# Patient Record
Sex: MALE | Race: WHITE | Employment: FULL TIME | URBAN - METROPOLITAN AREA
[De-identification: names, ages, dates, MRNs, and addresses within clinical notes are randomized per-mention and may not be internally consistent; named-entity substitution may affect disease eponyms.]

---

## 2018-10-12 ENCOUNTER — HOSPITAL ENCOUNTER (OUTPATIENT)
Age: 38
Discharge: HOME | End: 2018-10-14

## 2018-10-12 PROCEDURE — 88304 TISSUE EXAM BY PATHOLOGIST: CPT

## 2019-01-26 LAB
ALBUMIN SERPL-MCNC: NORMAL G/DL
ALP BLD-CCNC: NORMAL U/L
ALT SERPL-CCNC: NORMAL U/L
ANION GAP SERPL CALCULATED.3IONS-SCNC: NORMAL MMOL/L
AST SERPL-CCNC: NORMAL U/L
BILIRUB SERPL-MCNC: NORMAL MG/DL
BUN BLDV-MCNC: NORMAL MG/DL
CALCIUM SERPL-MCNC: NORMAL MG/DL
CHLORIDE BLD-SCNC: NORMAL MMOL/L
CHOLESTEROL, TOTAL: NORMAL
CHOLESTEROL/HDL RATIO: NORMAL
CO2: NORMAL
CREAT SERPL-MCNC: NORMAL MG/DL
GFR CALCULATED: NORMAL
GLUCOSE BLD-MCNC: NORMAL MG/DL
HDLC SERPL-MCNC: NORMAL MG/DL
LDL CHOLESTEROL CALCULATED: NORMAL
POTASSIUM SERPL-SCNC: NORMAL MMOL/L
SODIUM BLD-SCNC: NORMAL MMOL/L
TOTAL PROTEIN: NORMAL
TRIGL SERPL-MCNC: NORMAL MG/DL
TSH SERPL DL<=0.05 MIU/L-ACNC: NORMAL M[IU]/L
VLDLC SERPL CALC-MCNC: NORMAL MG/DL

## 2019-08-16 ENCOUNTER — OFFICE VISIT (OUTPATIENT)
Dept: FAMILY MEDICINE CLINIC | Age: 39
End: 2019-08-16
Payer: COMMERCIAL

## 2019-08-16 VITALS
WEIGHT: 315 LBS | HEART RATE: 72 BPM | OXYGEN SATURATION: 98 % | BODY MASS INDEX: 42.66 KG/M2 | TEMPERATURE: 96.9 F | HEIGHT: 72 IN | DIASTOLIC BLOOD PRESSURE: 82 MMHG | SYSTOLIC BLOOD PRESSURE: 126 MMHG

## 2019-08-16 DIAGNOSIS — J40 BRONCHITIS: Primary | ICD-10-CM

## 2019-08-16 PROCEDURE — 99213 OFFICE O/P EST LOW 20 MIN: CPT | Performed by: FAMILY MEDICINE

## 2019-08-16 RX ORDER — AZITHROMYCIN 250 MG/1
TABLET, FILM COATED ORAL
Qty: 6 TABLET | Refills: 0 | Status: SHIPPED | OUTPATIENT
Start: 2019-08-16 | End: 2019-08-26 | Stop reason: ALTCHOICE

## 2019-08-16 RX ORDER — PREDNISONE 10 MG/1
TABLET ORAL
Qty: 12 TABLET | Refills: 0 | Status: SHIPPED | OUTPATIENT
Start: 2019-08-16 | End: 2019-08-22

## 2019-08-16 RX ORDER — SERTRALINE HYDROCHLORIDE 100 MG/1
100 TABLET, FILM COATED ORAL DAILY
Refills: 12 | COMMUNITY
Start: 2019-07-25 | End: 2020-02-12 | Stop reason: SDUPTHER

## 2019-08-16 NOTE — PROGRESS NOTES
19  Josue Beachy : 1980 Sex: male  Age: 46 y.o. Chief Complaint   Patient presents with    Congestion     has been using OTC medication but it has not been helping x2 weeks       HPI  HPI:    Patient presents today complaining of cough for over 2 weeks, productive, intermittent wheezing, no chest pain shortness of breath. No headache dizziness fever chills. No significant nasal congestion. No abdominal symptoms. No other complaints or concerns. ROS:  As above      Current Outpatient Medications:     sertraline (ZOLOFT) 100 MG tablet, Take 100 mg by mouth daily, Disp: , Rfl: 12    predniSONE (DELTASONE) 10 MG tablet, Take 3 tablets by mouth daily for 2 days, THEN 2 tablets daily for 2 days, THEN 1 tablet daily for 2 days. , Disp: 12 tablet, Rfl: 0    azithromycin (ZITHROMAX) 250 MG tablet, 2 po qd  day 1 then 1 po qd  days 2 - 5, Disp: 6 tablet, Rfl: 0  No Known Allergies    History reviewed. No pertinent past medical history. History reviewed. No pertinent surgical history. History reviewed. No pertinent family history. Social History     Tobacco Use    Smoking status: Never Smoker    Smokeless tobacco: Never Used   Substance Use Topics    Alcohol use: Not on file    Drug use: Not on file      Social History     Social History Narrative    Not on file        Vitals:    19 0818   BP: 126/82   Pulse: 72   Temp: 96.9 °F (36.1 °C)   TempSrc: Tympanic   SpO2: 98%   Weight: (!) 317 lb 8 oz (144 kg)   Height: 6' (1.829 m)     Wt Readings from Last 3 Encounters:   19 (!) 317 lb 8 oz (144 kg)        Physical Exam    Exam:  Const: Appears comfortable. No signs of acute distress present. Head/Face: Atraumatic, normocephalic on inspection. Eyes: No discharge from the eyes. Sclerae clear. ENMT:   Ears fluid, nose boggy, oral pharynx thick postnasal drainage no exudate swelling or asymmetry. Neck: Supple. Palpation reveals no adenopathy. No masses appreciated. No JVD.   Resp:

## 2019-08-26 ENCOUNTER — OFFICE VISIT (OUTPATIENT)
Dept: FAMILY MEDICINE CLINIC | Age: 39
End: 2019-08-26
Payer: COMMERCIAL

## 2019-08-26 VITALS
RESPIRATION RATE: 16 BRPM | SYSTOLIC BLOOD PRESSURE: 130 MMHG | HEIGHT: 72 IN | TEMPERATURE: 97.7 F | OXYGEN SATURATION: 99 % | DIASTOLIC BLOOD PRESSURE: 82 MMHG | BODY MASS INDEX: 42.66 KG/M2 | WEIGHT: 315 LBS | HEART RATE: 77 BPM

## 2019-08-26 DIAGNOSIS — J06.9 UPPER RESPIRATORY TRACT INFECTION, UNSPECIFIED TYPE: Primary | ICD-10-CM

## 2019-08-26 DIAGNOSIS — R09.82 POSTNASAL DRIP: ICD-10-CM

## 2019-08-26 DIAGNOSIS — J01.90 ACUTE NON-RECURRENT SINUSITIS, UNSPECIFIED LOCATION: ICD-10-CM

## 2019-08-26 DIAGNOSIS — R07.0 PAIN IN THROAT: ICD-10-CM

## 2019-08-26 PROCEDURE — 99214 OFFICE O/P EST MOD 30 MIN: CPT | Performed by: PHYSICIAN ASSISTANT

## 2019-08-26 RX ORDER — ALBUTEROL SULFATE 90 UG/1
2 AEROSOL, METERED RESPIRATORY (INHALATION) 4 TIMES DAILY PRN
Qty: 1 INHALER | Refills: 0 | Status: SHIPPED
Start: 2019-08-26 | End: 2021-02-26

## 2019-08-26 RX ORDER — DOXYCYCLINE HYCLATE 100 MG
100 TABLET ORAL 2 TIMES DAILY
Qty: 20 TABLET | Refills: 0 | Status: SHIPPED | OUTPATIENT
Start: 2019-08-26 | End: 2019-09-05

## 2019-08-26 RX ORDER — DEXTROMETHORPHAN HYDROBROMIDE AND PROMETHAZINE HYDROCHLORIDE 15; 6.25 MG/5ML; MG/5ML
5 SYRUP ORAL 4 TIMES DAILY PRN
Qty: 120 ML | Refills: 0 | Status: SHIPPED | OUTPATIENT
Start: 2019-08-26 | End: 2019-09-02

## 2019-08-26 SDOH — HEALTH STABILITY: MENTAL HEALTH: HOW OFTEN DO YOU HAVE A DRINK CONTAINING ALCOHOL?: 2-3 TIMES A WEEK

## 2019-08-26 NOTE — PROGRESS NOTES
19  Jalen Hearn Dvorovy : 1980 Sex: male  Age 46 y.o. Subjective:  Chief Complaint   Patient presents with    Chest Congestion     coughed up blood yesterday morning, was in last week          HPI:   Beatriz Hernandez , 46 y.o. male presents to express care for evaluation of cough, congestion and recent bronchitis. The patient has had a cough and congestion ongoing for last couple weeks. The patient coughed up some blood yesterday. It was more of a mucus tinged blood. There was no isela hemoptysis. The patient is not on any anticoagulants. No history of bleeding disorders. He was evaluated last week and placed on a azithromycin and prednisone. He has finished both courses and states that he is still symptomatic. The patient is not having any lightheadedness or dizziness. No syncope. No history of lung pathology. He is not a smoker. Patient denies any other complaints at this time. ROS:   Unless otherwise stated in this report the patient's positive and negative responses for review of systems for constitutional, eyes, ENT, cardiovascular, respiratory, gastrointestinal, neurological, , musculoskeletal, and integument systems and related systems to the presenting problem are either stated in the history of present illness or were not pertinent or were negative for the symptoms and/or complaints related to the presenting medical problem. Positives and pertinent negatives as per HPI. All others reviewed and are negative. PMH:     Past Medical History:   Diagnosis Date    Depression        Past Surgical History:   Procedure Laterality Date    LIPOMA RESECTION         History reviewed. No pertinent family history.     Medications:     Current Outpatient Medications:     sertraline (ZOLOFT) 100 MG tablet, Take 100 mg by mouth daily, Disp: , Rfl: 12    Allergies:   No Known Allergies    Social History:     Social History     Tobacco Use    Smoking status: Never Smoker    Smokeless

## 2020-02-12 ENCOUNTER — OFFICE VISIT (OUTPATIENT)
Dept: PRIMARY CARE CLINIC | Age: 40
End: 2020-02-12
Payer: COMMERCIAL

## 2020-02-12 VITALS
HEART RATE: 76 BPM | BODY MASS INDEX: 44.21 KG/M2 | OXYGEN SATURATION: 98 % | RESPIRATION RATE: 18 BRPM | DIASTOLIC BLOOD PRESSURE: 78 MMHG | SYSTOLIC BLOOD PRESSURE: 132 MMHG | TEMPERATURE: 98.6 F | WEIGHT: 315 LBS

## 2020-02-12 PROBLEM — E66.01 CLASS 3 SEVERE OBESITY DUE TO EXCESS CALORIES WITHOUT SERIOUS COMORBIDITY WITH BODY MASS INDEX (BMI) OF 40.0 TO 44.9 IN ADULT (HCC): Status: ACTIVE | Noted: 2020-02-12

## 2020-02-12 PROBLEM — G47.33 OSA (OBSTRUCTIVE SLEEP APNEA): Status: ACTIVE | Noted: 2020-02-12

## 2020-02-12 PROBLEM — Z00.00 ROUTINE ADULT HEALTH MAINTENANCE: Status: ACTIVE | Noted: 2020-02-12

## 2020-02-12 PROBLEM — E66.813 CLASS 3 SEVERE OBESITY DUE TO EXCESS CALORIES WITHOUT SERIOUS COMORBIDITY WITH BODY MASS INDEX (BMI) OF 40.0 TO 44.9 IN ADULT: Status: ACTIVE | Noted: 2020-02-12

## 2020-02-12 PROBLEM — F41.9 ANXIETY: Status: ACTIVE | Noted: 2020-02-12

## 2020-02-12 PROCEDURE — 93000 ELECTROCARDIOGRAM COMPLETE: CPT | Performed by: FAMILY MEDICINE

## 2020-02-12 PROCEDURE — 99395 PREV VISIT EST AGE 18-39: CPT | Performed by: FAMILY MEDICINE

## 2020-02-12 RX ORDER — SERTRALINE HYDROCHLORIDE 100 MG/1
100 TABLET, FILM COATED ORAL DAILY
Qty: 30 TABLET | Refills: 12 | Status: SHIPPED
Start: 2020-02-12 | End: 2021-02-26 | Stop reason: SDUPTHER

## 2020-02-12 ASSESSMENT — PATIENT HEALTH QUESTIONNAIRE - PHQ9
SUM OF ALL RESPONSES TO PHQ QUESTIONS 1-9: 0
1. LITTLE INTEREST OR PLEASURE IN DOING THINGS: 0
SUM OF ALL RESPONSES TO PHQ QUESTIONS 1-9: 0
2. FEELING DOWN, DEPRESSED OR HOPELESS: 0
SUM OF ALL RESPONSES TO PHQ9 QUESTIONS 1 & 2: 0

## 2020-02-12 NOTE — PROGRESS NOTES
20  Ivonne Alexander Dvorovy : 1980 Sex: male  Age: 46 y.o. Chief Complaint   Patient presents with    Annual Exam       HPI  HPI:    Overall feeling very well. HDL 39  triglyceride 102, glucose 105, counseled, CMP CBC TSH normal.    Counseled on wt. Less active. Counseled. Health Maintenance: Well balanced/proper diet reviewed. Counseled on MVI, fiber, b-complex, calcium  and fish oils (including pros/cons of OTC vs Rx). Exercise recommendations reviewed. Reviewed  recommendations regarding Td (Tdap) (2013), pneumovax/prevar 13, flu shot (He'll defer to pharmacy) , Hepatitis  vaccines and shingles vaccine, had chicken pox. Importance of Regular Eye and Dental exams reviewed. Risks/Benefits of ASA reviewed and discussed current recommendations. Caffeine risks/limits and Sun  protection reviewed. Notify if any new or changing moles/skin lesions etc. Dexa Scan indications/ risk  factors for osteoportic fractures and prevention reviewed. Counseled on testicular exams, prostate  exams. Colonoscopy recommendations reviewed. Pt denies change in bowel habbits or [address] of colon  polyps/CA. Heme Cards discussed. EKG done and reviewed w/ pt. No interest in cardiac testing including referral, Stress test, 2D echo, etc.  Reviewed indications for PFT's. asx. Also counseled on indications for imaging. Pt declines further  imaging including Brain, carotid, chest, Abdominal, Aortic or otherwise. Defers ua this year. Review of Systems  ROS:  Const: Denies chills, fever, malaise and sweats. Eyes: Denies discharge, pain, redness and visual disturbance. ENMT: Denies earaches, other ear symptoms. Denies nasal or sinus symptoms other than stated  above. Denies mouth and tongue lesions and sore throat.   CV: Denies chest discomfort, pain; diaphoresis, dizziness, edema, lightheadedness, orthopnea,  palpitations, syncope and near syncopal episode or any exertional symptoms  Resp: Denies cough, hemoptysis,

## 2020-02-12 NOTE — ASSESSMENT & PLAN NOTE
Counseled. Declines formal intervention. Lifestyle modification emphasized, healthy diet and exercise. Counseled on wt watchers.

## 2020-03-13 PROBLEM — Z00.00 ROUTINE ADULT HEALTH MAINTENANCE: Status: RESOLVED | Noted: 2020-02-12 | Resolved: 2020-03-13

## 2021-02-05 DIAGNOSIS — Z00.00 ROUTINE ADULT HEALTH MAINTENANCE: ICD-10-CM

## 2021-02-05 LAB
ALBUMIN SERPL-MCNC: 4.6 G/DL (ref 3.5–5.2)
ALP BLD-CCNC: 85 U/L (ref 40–129)
ALT SERPL-CCNC: 30 U/L (ref 0–40)
ANION GAP SERPL CALCULATED.3IONS-SCNC: 15 MMOL/L (ref 7–16)
AST SERPL-CCNC: 28 U/L (ref 0–39)
BASOPHILS ABSOLUTE: 0.02 E9/L (ref 0–0.2)
BASOPHILS RELATIVE PERCENT: 0.3 % (ref 0–2)
BILIRUB SERPL-MCNC: 0.6 MG/DL (ref 0–1.2)
BILIRUBIN URINE: NEGATIVE
BLOOD, URINE: NEGATIVE
BUN BLDV-MCNC: 17 MG/DL (ref 6–20)
CALCIUM SERPL-MCNC: 9.4 MG/DL (ref 8.6–10.2)
CHLORIDE BLD-SCNC: 98 MMOL/L (ref 98–107)
CHOLESTEROL, TOTAL: 135 MG/DL (ref 0–199)
CLARITY: CLEAR
CO2: 24 MMOL/L (ref 22–29)
COLOR: YELLOW
CREAT SERPL-MCNC: 1.2 MG/DL (ref 0.7–1.2)
EOSINOPHILS ABSOLUTE: 0.2 E9/L (ref 0.05–0.5)
EOSINOPHILS RELATIVE PERCENT: 2.6 % (ref 0–6)
GFR AFRICAN AMERICAN: >60
GFR NON-AFRICAN AMERICAN: >60 ML/MIN/1.73
GLUCOSE BLD-MCNC: 103 MG/DL (ref 74–99)
GLUCOSE URINE: NEGATIVE MG/DL
HCT VFR BLD CALC: 48.1 % (ref 37–54)
HDLC SERPL-MCNC: 36 MG/DL
HEMOGLOBIN: 16.4 G/DL (ref 12.5–16.5)
IMMATURE GRANULOCYTES #: 0.02 E9/L
IMMATURE GRANULOCYTES %: 0.3 % (ref 0–5)
KETONES, URINE: NEGATIVE MG/DL
LDL CHOLESTEROL CALCULATED: 80 MG/DL (ref 0–99)
LEUKOCYTE ESTERASE, URINE: NEGATIVE
LYMPHOCYTES ABSOLUTE: 1.79 E9/L (ref 1.5–4)
LYMPHOCYTES RELATIVE PERCENT: 23.4 % (ref 20–42)
MCH RBC QN AUTO: 28.9 PG (ref 26–35)
MCHC RBC AUTO-ENTMCNC: 34.1 % (ref 32–34.5)
MCV RBC AUTO: 84.7 FL (ref 80–99.9)
MONOCYTES ABSOLUTE: 0.72 E9/L (ref 0.1–0.95)
MONOCYTES RELATIVE PERCENT: 9.4 % (ref 2–12)
NEUTROPHILS ABSOLUTE: 4.91 E9/L (ref 1.8–7.3)
NEUTROPHILS RELATIVE PERCENT: 64 % (ref 43–80)
NITRITE, URINE: NEGATIVE
PDW BLD-RTO: 13.1 FL (ref 11.5–15)
PH UA: 6 (ref 5–9)
PLATELET # BLD: 291 E9/L (ref 130–450)
PMV BLD AUTO: 9.4 FL (ref 7–12)
POTASSIUM SERPL-SCNC: 4 MMOL/L (ref 3.5–5)
PROTEIN UA: NEGATIVE MG/DL
RBC # BLD: 5.68 E12/L (ref 3.8–5.8)
SODIUM BLD-SCNC: 137 MMOL/L (ref 132–146)
SPECIFIC GRAVITY UA: 1.02 (ref 1–1.03)
TOTAL PROTEIN: 7.5 G/DL (ref 6.4–8.3)
TRIGL SERPL-MCNC: 96 MG/DL (ref 0–149)
TSH SERPL DL<=0.05 MIU/L-ACNC: 3.41 UIU/ML (ref 0.27–4.2)
UROBILINOGEN, URINE: 0.2 E.U./DL
VLDLC SERPL CALC-MCNC: 19 MG/DL
WBC # BLD: 7.7 E9/L (ref 4.5–11.5)

## 2021-02-26 ENCOUNTER — OFFICE VISIT (OUTPATIENT)
Dept: PRIMARY CARE CLINIC | Age: 41
End: 2021-02-26
Payer: COMMERCIAL

## 2021-02-26 VITALS
BODY MASS INDEX: 42.66 KG/M2 | HEIGHT: 72 IN | DIASTOLIC BLOOD PRESSURE: 72 MMHG | OXYGEN SATURATION: 96 % | WEIGHT: 315 LBS | HEART RATE: 67 BPM | SYSTOLIC BLOOD PRESSURE: 128 MMHG | TEMPERATURE: 97.5 F

## 2021-02-26 DIAGNOSIS — G47.33 OSA (OBSTRUCTIVE SLEEP APNEA): ICD-10-CM

## 2021-02-26 DIAGNOSIS — F41.9 ANXIETY: ICD-10-CM

## 2021-02-26 DIAGNOSIS — E66.01 CLASS 3 SEVERE OBESITY DUE TO EXCESS CALORIES WITHOUT SERIOUS COMORBIDITY WITH BODY MASS INDEX (BMI) OF 40.0 TO 44.9 IN ADULT (HCC): ICD-10-CM

## 2021-02-26 DIAGNOSIS — Z00.00 ROUTINE ADULT HEALTH MAINTENANCE: Primary | ICD-10-CM

## 2021-02-26 PROCEDURE — 90471 IMMUNIZATION ADMIN: CPT | Performed by: FAMILY MEDICINE

## 2021-02-26 PROCEDURE — 99395 PREV VISIT EST AGE 18-39: CPT | Performed by: FAMILY MEDICINE

## 2021-02-26 PROCEDURE — 90686 IIV4 VACC NO PRSV 0.5 ML IM: CPT | Performed by: FAMILY MEDICINE

## 2021-02-26 RX ORDER — SERTRALINE HYDROCHLORIDE 100 MG/1
100 TABLET, FILM COATED ORAL DAILY
Qty: 90 TABLET | Refills: 3 | Status: SHIPPED
Start: 2021-02-26 | End: 2021-03-16 | Stop reason: SDUPTHER

## 2021-02-26 SDOH — ECONOMIC STABILITY: INCOME INSECURITY: HOW HARD IS IT FOR YOU TO PAY FOR THE VERY BASICS LIKE FOOD, HOUSING, MEDICAL CARE, AND HEATING?: PATIENT DECLINED

## 2021-02-26 SDOH — ECONOMIC STABILITY: TRANSPORTATION INSECURITY
IN THE PAST 12 MONTHS, HAS LACK OF TRANSPORTATION KEPT YOU FROM MEETINGS, WORK, OR FROM GETTING THINGS NEEDED FOR DAILY LIVING?: PATIENT DECLINED

## 2021-02-26 SDOH — ECONOMIC STABILITY: TRANSPORTATION INSECURITY
IN THE PAST 12 MONTHS, HAS THE LACK OF TRANSPORTATION KEPT YOU FROM MEDICAL APPOINTMENTS OR FROM GETTING MEDICATIONS?: PATIENT DECLINED

## 2021-02-26 ASSESSMENT — PATIENT HEALTH QUESTIONNAIRE - PHQ9
SUM OF ALL RESPONSES TO PHQ QUESTIONS 1-9: 0
SUM OF ALL RESPONSES TO PHQ QUESTIONS 1-9: 0
2. FEELING DOWN, DEPRESSED OR HOPELESS: 0
SUM OF ALL RESPONSES TO PHQ9 QUESTIONS 1 & 2: 0

## 2021-02-26 NOTE — PROGRESS NOTES
20  William Monroy Dvorovy : 1980 Sex: male  Age: 45 y.o. Chief Complaint   Patient presents with    Annual Exam       HPI  HPI:    Overall feeling very well. Health Maintenance: Well balanced/proper diet reviewed. Counseled on vitamins/supplents. Exercise recommendations reviewed. Reviewed  recommendations regarding Td (Tdap) (), pneumovax/prevar 13, flu shot (He'll defer to pharmacy) , Hepatitis  vaccines and shingles vaccine, had chicken pox. Importance of Regular Eye and Dental exams reviewed. Risks/Benefits of ASA reviewed and discussed current recommendations. Caffeine risks/limits and Sun  protection reviewed. Notify if any new or changing moles/skin lesions etc. Dexa Scan indications/ risk  factors for osteoportic fractures and prevention reviewed. Counseled on testicular exams, prostate  exams. Colonoscopy recommendations reviewed. Pt denies change in bowel habbits or [address] of colon  polyps/CA. Heme Cards discussed. Indications for EKG and other  cardiac testing including referral, Stress test, 2D echo, etc. dasx  Reviewed indications for PFT's. asx. Also counseled on indications for imaging for further  imaging including Brain, carotid, chest, Abdominal, Aortic or otherwise.         Most Recent Labs  CBC  Lab Results   Component Value Date    WBC 7.7 2021    RBC 5.68 2021    HGB 16.4 2021    HCT 48.1 2021    MCV 84.7 2021     2021      CMP  Lab Results   Component Value Date     2021    K 4.0 2021    CL 98 2021    CO2 24 2021    ANIONGAP 15 2021    GLUCOSE 103 2021    BUN 17 2021    CREATININE 1.2 2021    LABGLOM >60 2021    GFRAA >60 2021    CALCIUM 9.4 2021    PROT 7.5 2021    LABALBU 4.6 2021    BILITOT 0.6 2021    ALKPHOS 85 2021    AST 28 2021    ALT 30 2021     A1C  No results found for: LABA1C  TSH  Lab Results Component Value Date    TSH 3.410 02/05/2021     FREET4  No results found for: U3PZAYU  LIPID  Lab Results   Component Value Date    CHOL 135 02/05/2021    HDL 36 02/05/2021    LDLCALC 80 02/05/2021    TRIG 96 02/05/2021     VITAMIN D  No results found for: VITD25  MAGNESIUM  No results found for: MG   PHOS  No results found for: PHOS   BIJU   No results found for: BIJU  RHEUMATOID FACTOR  No results found for: RF  PSA  No results found for: PSA   HEPATITIS C  No results found for: HCVABI  HIV  No results found for: BKL2QOP, HIV1QT  UA  Lab Results   Component Value Date    COLORU Yellow 02/05/2021    CLARITYU Clear 02/05/2021    GLUCOSEU Negative 02/05/2021    BILIRUBINUR Negative 02/05/2021    KETUA Negative 02/05/2021    SPECGRAV 1.025 02/05/2021    BLOODU Negative 02/05/2021    PHUR 6.0 02/05/2021    PROTEINU Negative 02/05/2021    UROBILINOGEN 0.2 02/05/2021    NITRU Negative 02/05/2021    LEUKOCYTESUR Negative 02/05/2021     Urine Micro/Albumin Ratio  No results found for: MALBCR      ROS:  Const: Denies chills, fever, malaise and sweats. Eyes: Denies discharge, pain, redness and visual disturbance. ENMT: Denies earaches, other ear symptoms. Denies nasal or sinus symptoms other than stated  above. Denies mouth and tongue lesions and sore throat. CV: Denies chest discomfort, pain; diaphoresis, dizziness, edema, lightheadedness, orthopnea,  palpitations, syncope and near syncopal episode or any exertional symptoms  Resp: Denies cough, hemoptysis, pleuritic pain, SOB, sputum production and wheezing. GI: Denies abdominal pain, change in bowel habits, hematochezia, melena, nausea and vomiting. : Denies urinary symptoms including dysuria , urgency, frequency or hematuria. Musculo: Denies musculoskeletal symptoms. Skin: Denies bruising and rash.   Neuro: Denies headache, numbness, stiff neck, tingling and focal weakness slurred speech or facial  droop Hema/Lymph: Denies bleeding/bruising tendency and enlarged lymph nodes        Current Outpatient Medications:     sertraline (ZOLOFT) 100 MG tablet, Take 1 tablet by mouth daily, Disp: 90 tablet, Rfl: 3  No Known Allergies    Past Medical History:   Diagnosis Date    Depression      Past Surgical History:   Procedure Laterality Date    LIPOMA RESECTION       Family History   Problem Relation Age of Onset    Cancer Mother         breast      Social History     Tobacco Use    Smoking status: Never Smoker    Smokeless tobacco: Never Used   Substance Use Topics    Alcohol use: Yes     Frequency: 2-3 times a week     Comment: occasionally     Drug use: Never      Social History     Social History Narrative    PMH:    Medical Problems:    Denies    Surgical Hx:    Denies    Reviewed, no changes. FH:    Father:    . (Hx)    Mother:    . (Hx)    mom - breast ca dx late 42's,  54    dad - healthy    Reviewed, no changes. SH:    . (Marital),     wife  mrdd    1 boy 5yrs as of     Personal Habits: Cigarette Use: Never Smoked Cigarettes. Alcohol: Occasionally consumes    alcohol. Reviewed, no changes. Vitals:    21 0905   BP: 128/72   Pulse: 67   Temp: 97.5 °F (36.4 °C)   SpO2: 96%   Weight: (!) 319 lb (144.7 kg)   Height: 6' (1.829 m)      Wt Readings from Last 3 Encounters:   21 (!) 319 lb (144.7 kg)   20 (!) 326 lb (147.9 kg)   19 (!) 318 lb 2.9 oz (144.3 kg)          Exam:  Const: Appears comfortable. No signs of acute distress present. Head/Face: Atraumatic on inspection. Eyes: EOMI in both eyes. No discharge from the eyes. PERRL. Sclerae clear. ENMT: Auditory canals normal. Tympanic membranes: intact and translucent. External nose WNL. Nasal mucosa is clear. Oropharynx: No erythema or exudate. Posterior pharynx is normal.  Neck: Supple. Palpation reveals no adenopathy. No masses appreciated. No JVD.  Carotids: no bruits. Resp: Respirations are unlabored. Clear to auscultation. No rales, rhonchi or wheezes appreciated  over the lungs bilaterally. CV: Rate is regular. Rhythm is regular. No gallop or rubs. No heart murmur appreciated. Extremities: No clubbing, cyanosis, or edema. No calf inflammation or tenderness. Abdomen: Bowel sounds are normoactive. Abdomen is soft, nontender, and nondistended. No  abdominal masses. No palpable hepatosplenomegaly. Lymph: No palpable or visible regional lymphadenopathy. Musculoskeletal: no acute joint inflammation. Skin: Dry and warm with no rash. Skin normal to inspection and palpation overall. Neuro: Alert and oriented. Affect: appropriate. Upper Extremities: 5/5 bilaterally. Lower Extremities:  5/5 bilaterally. Sensation intact to light touch. Reflexes: DTR's are symmetric and 2+ bilaterally. .  Cranial Nerves: Cranial nerves grossly intact. Genitalia- defers      Assessment and Plan:   Diagnosis Orders   1. Routine adult health maintenance  PSA screening    Lipid Panel    TSH without Reflex    Comprehensive Metabolic Panel    CBC Auto Differential    Urinalysis    INFLUENZA, QUADV, 3 YRS AND OLDER, IM PF, PREFILL SYR OR SDV, 0.5ML (AFLURIA QUADV, PF)   2. Anxiety  sertraline (ZOLOFT) 100 MG tablet   3. Class 3 severe obesity due to excess calories without serious comorbidity with body mass index (BMI) of 40.0 to 44.9 in adult ([address]     4. ANA LUISA (obstructive sleep apnea)         Routine adult health maintenance  Health maintenance issues discussed at length as above, 2/21. Encouraged yearly physicals. Anxiety  Counseled. Tolerating zoloft very well and wants to continue. SE's/Instructions/R/B ADR reviewed. approp counseling reviewed. RF.  Adamantly denies SI/HI    Class 3 severe obesity due to excess calories without serious comorbidity with body mass index (BMI) of 40.0 to 44.9 in adult [facility]) Counseled. Declines formal intervention. Lifestyle modification emphasized, healthy diet and exercise. Counseled on wt watchers. Defers formal intervention    ANA LUISA (obstructive sleep apnea)  Counseled. Tolerating CPAP very well and asx with it. Uses it at least 6-8 hours per night 7 nights per week. No narcoleptic sxs         No flowsheet data found. Plan as above. Counseled extensively and differential diagnoses relevant to above were reviewed, including serious etiologies. Side effects and interactions of medications were reviewed. rf zoloft. Simply wants bw and fu 1yr, sooner prn. Wt loss measures reviewed. As long as symptoms steadily improve/resolve, and medical conditions follow the expected course, FU as below, sooner PRN. Return in about 1 year (around 2/26/2022), or if symptoms worsen or fail to improve, for physical.         Signs and symptoms to watch for discussed, serious signs and symptoms reviewed. ER if any. Cris Weinstein MD    Patients are advised to check with insurance company to ensure coverage and to fully understand benefits and cost prior to any testing. This note was created with the assistance of voice recognition software. Document was reviewed however may contain grammatical errors.

## 2021-03-16 DIAGNOSIS — F41.9 ANXIETY: ICD-10-CM

## 2021-03-16 RX ORDER — SERTRALINE HYDROCHLORIDE 100 MG/1
100 TABLET, FILM COATED ORAL DAILY
Qty: 90 TABLET | Refills: 3 | Status: SHIPPED
Start: 2021-03-16 | End: 2022-03-11 | Stop reason: SDUPTHER

## 2022-03-04 DIAGNOSIS — Z00.00 ROUTINE ADULT HEALTH MAINTENANCE: ICD-10-CM

## 2022-03-04 LAB
ALBUMIN SERPL-MCNC: 4.5 G/DL (ref 3.5–5.2)
ALP BLD-CCNC: 90 U/L (ref 40–129)
ALT SERPL-CCNC: 29 U/L (ref 0–40)
ANION GAP SERPL CALCULATED.3IONS-SCNC: 17 MMOL/L (ref 7–16)
AST SERPL-CCNC: 26 U/L (ref 0–39)
BASOPHILS ABSOLUTE: 0.04 E9/L (ref 0–0.2)
BASOPHILS RELATIVE PERCENT: 0.4 % (ref 0–2)
BILIRUB SERPL-MCNC: 0.7 MG/DL (ref 0–1.2)
BILIRUBIN URINE: NEGATIVE
BLOOD, URINE: NEGATIVE
BUN BLDV-MCNC: 17 MG/DL (ref 6–20)
CALCIUM SERPL-MCNC: 9.6 MG/DL (ref 8.6–10.2)
CHLORIDE BLD-SCNC: 101 MMOL/L (ref 98–107)
CHOLESTEROL, TOTAL: 172 MG/DL (ref 0–199)
CLARITY: CLEAR
CO2: 22 MMOL/L (ref 22–29)
COLOR: YELLOW
CREAT SERPL-MCNC: 1.1 MG/DL (ref 0.7–1.2)
EOSINOPHILS ABSOLUTE: 0.28 E9/L (ref 0.05–0.5)
EOSINOPHILS RELATIVE PERCENT: 3.1 % (ref 0–6)
GFR AFRICAN AMERICAN: >60
GFR NON-AFRICAN AMERICAN: >60 ML/MIN/1.73
GLUCOSE BLD-MCNC: 105 MG/DL (ref 74–99)
GLUCOSE URINE: NEGATIVE MG/DL
HCT VFR BLD CALC: 47.4 % (ref 37–54)
HDLC SERPL-MCNC: 39 MG/DL
HEMOGLOBIN: 16.1 G/DL (ref 12.5–16.5)
IMMATURE GRANULOCYTES #: 0.05 E9/L
IMMATURE GRANULOCYTES %: 0.5 % (ref 0–5)
KETONES, URINE: NEGATIVE MG/DL
LDL CHOLESTEROL CALCULATED: 110 MG/DL (ref 0–99)
LEUKOCYTE ESTERASE, URINE: NEGATIVE
LYMPHOCYTES ABSOLUTE: 2.19 E9/L (ref 1.5–4)
LYMPHOCYTES RELATIVE PERCENT: 24.1 % (ref 20–42)
MCH RBC QN AUTO: 28.9 PG (ref 26–35)
MCHC RBC AUTO-ENTMCNC: 34 % (ref 32–34.5)
MCV RBC AUTO: 84.9 FL (ref 80–99.9)
MONOCYTES ABSOLUTE: 0.83 E9/L (ref 0.1–0.95)
MONOCYTES RELATIVE PERCENT: 9.1 % (ref 2–12)
NEUTROPHILS ABSOLUTE: 5.71 E9/L (ref 1.8–7.3)
NEUTROPHILS RELATIVE PERCENT: 62.8 % (ref 43–80)
NITRITE, URINE: NEGATIVE
PDW BLD-RTO: 13.2 FL (ref 11.5–15)
PH UA: 6 (ref 5–9)
PLATELET # BLD: 275 E9/L (ref 130–450)
PMV BLD AUTO: 8.9 FL (ref 7–12)
POTASSIUM SERPL-SCNC: 4.8 MMOL/L (ref 3.5–5)
PROSTATE SPECIFIC ANTIGEN: 0.85 NG/ML (ref 0–4)
PROTEIN UA: NEGATIVE MG/DL
RBC # BLD: 5.58 E12/L (ref 3.8–5.8)
SODIUM BLD-SCNC: 140 MMOL/L (ref 132–146)
SPECIFIC GRAVITY UA: >=1.03 (ref 1–1.03)
TOTAL PROTEIN: 7.7 G/DL (ref 6.4–8.3)
TRIGL SERPL-MCNC: 116 MG/DL (ref 0–149)
TSH SERPL DL<=0.05 MIU/L-ACNC: 2.98 UIU/ML (ref 0.27–4.2)
UROBILINOGEN, URINE: 0.2 E.U./DL
VLDLC SERPL CALC-MCNC: 23 MG/DL
WBC # BLD: 9.1 E9/L (ref 4.5–11.5)

## 2022-03-11 ENCOUNTER — OFFICE VISIT (OUTPATIENT)
Dept: PRIMARY CARE CLINIC | Age: 42
End: 2022-03-11
Payer: COMMERCIAL

## 2022-03-11 VITALS
HEIGHT: 72 IN | HEART RATE: 78 BPM | SYSTOLIC BLOOD PRESSURE: 126 MMHG | OXYGEN SATURATION: 97 % | DIASTOLIC BLOOD PRESSURE: 62 MMHG | TEMPERATURE: 97.1 F | BODY MASS INDEX: 42.66 KG/M2 | WEIGHT: 315 LBS

## 2022-03-11 DIAGNOSIS — Z12.5 PROSTATE CANCER SCREENING: ICD-10-CM

## 2022-03-11 DIAGNOSIS — Z00.00 HEALTH MAINTENANCE EXAMINATION: Primary | ICD-10-CM

## 2022-03-11 DIAGNOSIS — E66.01 CLASS 3 SEVERE OBESITY DUE TO EXCESS CALORIES WITHOUT SERIOUS COMORBIDITY WITH BODY MASS INDEX (BMI) OF 40.0 TO 44.9 IN ADULT (HCC): ICD-10-CM

## 2022-03-11 DIAGNOSIS — F41.9 ANXIETY: ICD-10-CM

## 2022-03-11 DIAGNOSIS — G47.33 OSA (OBSTRUCTIVE SLEEP APNEA): ICD-10-CM

## 2022-03-11 PROCEDURE — 99396 PREV VISIT EST AGE 40-64: CPT | Performed by: FAMILY MEDICINE

## 2022-03-11 RX ORDER — SERTRALINE HYDROCHLORIDE 100 MG/1
100 TABLET, FILM COATED ORAL DAILY
Qty: 90 TABLET | Refills: 3 | Status: SHIPPED | OUTPATIENT
Start: 2022-03-11

## 2022-03-11 SDOH — ECONOMIC STABILITY: FOOD INSECURITY: WITHIN THE PAST 12 MONTHS, THE FOOD YOU BOUGHT JUST DIDN'T LAST AND YOU DIDN'T HAVE MONEY TO GET MORE.: NEVER TRUE

## 2022-03-11 SDOH — ECONOMIC STABILITY: FOOD INSECURITY: WITHIN THE PAST 12 MONTHS, YOU WORRIED THAT YOUR FOOD WOULD RUN OUT BEFORE YOU GOT MONEY TO BUY MORE.: NEVER TRUE

## 2022-03-11 ASSESSMENT — PATIENT HEALTH QUESTIONNAIRE - PHQ9
SUM OF ALL RESPONSES TO PHQ QUESTIONS 1-9: 0
SUM OF ALL RESPONSES TO PHQ9 QUESTIONS 1 & 2: 0
1. LITTLE INTEREST OR PLEASURE IN DOING THINGS: 0
SUM OF ALL RESPONSES TO PHQ QUESTIONS 1-9: 0
2. FEELING DOWN, DEPRESSED OR HOPELESS: 0

## 2022-03-11 ASSESSMENT — SOCIAL DETERMINANTS OF HEALTH (SDOH): HOW HARD IS IT FOR YOU TO PAY FOR THE VERY BASICS LIKE FOOD, HOUSING, MEDICAL CARE, AND HEATING?: NOT HARD AT ALL

## 2022-03-11 NOTE — PROGRESS NOTES
Addie Lee : 1980 Sex: male  Age: 37 y.o. Chief Complaint   Patient presents with    Annual Exam     declines ekg    Discuss Labs    Medication Refill       HPI  HPI:    Overall feeling very well. Health Maintenance: Well balanced/proper diet reviewed. Counseled on vitamins/supplents. Exercise recommendations reviewed. Reviewed  recommendations regarding Had covid , covid vaccine J&J x 1;  Td (Tdap) (), pneumovax/prevar 13, flu shot (agrees, ldeft before; may return if desires) , Hepatitis  vaccines and shingles vaccine, had chicken pox. Importance of Regular Eye and Dental exams reviewed. Risks/Benefits of ASA reviewed and discussed current recommendations. Caffeine risks/limits and Sun  protection reviewed. Notify if any new or changing moles/skin lesions etc. Dexa Scan indications/ risk  factors for osteoportic fractures and prevention reviewed. Counseled on testicular exams, prostate  exams. Colonoscopy recommendations reviewed. Pt denies change in bowel habbits or [address] of colon  polyps/CA. Heme Cards discussed. Indications for EKG (declines) and other  cardiac testing including referral, Stress test, 2D echo, etc. dasx  Reviewed indications for PFT's. asx. Also counseled on indications for imaging for further  imaging including Brain, carotid, chest, Abdominal, Aortic or otherwise. dasx      The 10-year ASCVD risk score (Briana Urias, et al., 2013) is: 1.1%    Values used to calculate the score:      Age: 37 years      Sex: Male      Is Non- : No      Diabetic: No      Tobacco smoker: No      Systolic Blood Pressure: 480 mmHg      Is BP treated: No      HDL Cholesterol: 39 mg/dL      Total Cholesterol: 172 mg/dL    LDL , HDL 36-39, ;      Most Recent Labs  CBC  Lab Results   Component Value Date    WBC 9.1 2022    WBC 7.7 2021    RBC 5.58 2022    RBC 5.68 2021    HGB 16.1 2022    HGB 16.4 2021    HCT 47.4 03/04/2022    HCT 48.1 02/05/2021    MCV 84.9 03/04/2022    MCV 84.7 02/05/2021     03/04/2022     02/05/2021      CMP  Lab Results   Component Value Date     03/04/2022     02/05/2021    K 4.8 03/04/2022    K 4.0 02/05/2021     03/04/2022    CL 98 02/05/2021    CO2 22 03/04/2022    CO2 24 02/05/2021    ANIONGAP 17 03/04/2022    ANIONGAP 15 02/05/2021    GLUCOSE 105 03/04/2022    GLUCOSE 103 02/05/2021    BUN 17 03/04/2022    BUN 17 02/05/2021    CREATININE 1.1 03/04/2022    CREATININE 1.2 02/05/2021    LABGLOM >60 03/04/2022    LABGLOM >60 02/05/2021    GFRAA >60 03/04/2022    GFRAA >60 02/05/2021    CALCIUM 9.6 03/04/2022    CALCIUM 9.4 02/05/2021    PROT 7.7 03/04/2022    PROT 7.5 02/05/2021    LABALBU 4.5 03/04/2022    LABALBU 4.6 02/05/2021    BILITOT 0.7 03/04/2022    BILITOT 0.6 02/05/2021    ALKPHOS 90 03/04/2022    ALKPHOS 85 02/05/2021    AST 26 03/04/2022    AST 28 02/05/2021    ALT 29 03/04/2022    ALT 30 02/05/2021     A1C  No results found for: LABA1C  TSH  Lab Results   Component Value Date    TSH 2.980 03/04/2022    TSH 3.410 02/05/2021     FREET4  No results found for: E2ZSPHG  LIPID  Lab Results   Component Value Date    CHOL 172 03/04/2022    CHOL 135 02/05/2021    HDL 39 03/04/2022    HDL 36 02/05/2021    LDLCALC 110 03/04/2022    LDLCALC 80 02/05/2021    TRIG 116 03/04/2022    TRIG 96 02/05/2021     VITAMIN D  No results found for: VITD25  MAGNESIUM  No results found for: MG   PHOS  No results found for: PHOS   BIJU   No results found for: BIJU  RHEUMATOID FACTOR  No results found for: RF  PSA  Lab Results   Component Value Date    PSA 0.85 03/04/2022      HEPATITIS C  No results found for: HCVABI  HIV  No results found for: DOW6GCL, HIV1QT  UA  Lab Results   Component Value Date    COLORU Yellow 03/04/2022    COLORU Yellow 02/05/2021    CLARITYU Clear 03/04/2022    CLARITYU Clear 02/05/2021    GLUCOSEU Negative 03/04/2022    GLUCOSEU Negative 02/05/2021 BILIRUBINUR Negative 03/04/2022    BILIRUBINUR Negative 02/05/2021    KETUA Negative 03/04/2022    KETUA Negative 02/05/2021    SPECGRAV >=1.030 03/04/2022    SPECGRAV 1.025 02/05/2021    BLOODU Negative 03/04/2022    BLOODU Negative 02/05/2021    PHUR 6.0 03/04/2022    PHUR 6.0 02/05/2021    PROTEINU Negative 03/04/2022    PROTEINU Negative 02/05/2021    UROBILINOGEN 0.2 03/04/2022    UROBILINOGEN 0.2 02/05/2021    NITRU Negative 03/04/2022    NITRU Negative 02/05/2021    LEUKOCYTESUR Negative 03/04/2022    LEUKOCYTESUR Negative 02/05/2021     Urine Micro/Albumin Ratio  No results found for: MALBCR      ROS:  Const: Denies chills, fever, malaise and sweats. Eyes: Denies discharge, pain, redness and visual disturbance. ENMT: Denies earaches, other ear symptoms. Denies nasal or sinus symptoms other than stated  above. Denies mouth and tongue lesions and sore throat. CV: Denies chest discomfort, pain; diaphoresis, dizziness, edema, lightheadedness, orthopnea,  palpitations, syncope and near syncopal episode or any exertional symptoms  Resp: Denies cough, hemoptysis, pleuritic pain, SOB, sputum production and wheezing. GI: Denies abdominal pain, change in bowel habits, hematochezia, melena, nausea and vomiting. : Denies urinary symptoms including dysuria , urgency, frequency or hematuria. Musculo: Denies musculoskeletal symptoms. Skin: Denies bruising and rash.   Neuro: Denies headache, numbness, stiff neck, tingling and focal weakness slurred speech or facial  droop  Hema/Lymph: Denies bleeding/bruising tendency and enlarged lymph nodes        Current Outpatient Medications:     sertraline (ZOLOFT) 100 MG tablet, Take 1 tablet by mouth daily, Disp: 90 tablet, Rfl: 3  No Known Allergies    Past Medical History:   Diagnosis Date    Depression      Past Surgical History:   Procedure Laterality Date    LIPOMA RESECTION       Family History   Problem Relation Age of Onset    Cancer Mother         breast Social History     Tobacco Use    Smoking status: Never Smoker    Smokeless tobacco: Never Used   Substance Use Topics    Alcohol use: Yes     Comment: occasionally     Drug use: Never      Social History     Social History Narrative    PMH:    Medical Problems:    Denies    Surgical Hx:    Denies    Reviewed, no changes. FH:    Father:    . (Hx)    Mother:    . (Hx)    mom - breast ca dx late 42's,  54    dad - healthy    Reviewed, no changes. SH:    . (Marital),     wife  mrdd    1 boy 5yrs as of     Personal Habits: Cigarette Use: Never Smoked Cigarettes. Alcohol: Occasionally consumes    alcohol. Reviewed, no changes. Vitals:    22 0903   BP: 126/62   Pulse: 78   Temp: 97.1 °F (36.2 °C)   SpO2: 97%   Weight: (!) 333 lb (151 kg)   Height: 6' (1.829 m)      Wt Readings from Last 3 Encounters:   22 (!) 333 lb (151 kg)   21 (!) 319 lb (144.7 kg)   20 (!) 326 lb (147.9 kg)      Counseled on wt. Exam:  Const: Appears comfortable. No signs of acute distress present. Head/Face: Atraumatic on inspection. Eyes: EOMI in both eyes. No discharge from the eyes. PERRL. Sclerae clear. ENMT: Auditory canals normal. Tympanic membranes: intact and translucent. External nose WNL. Nasal mucosa is clear. Oropharynx: No erythema or exudate. Posterior pharynx is normal.  Neck: Supple. Palpation reveals no adenopathy. No masses appreciated. No JVD. Carotids: no  bruits. Resp: Respirations are unlabored. Clear to auscultation. No rales, rhonchi or wheezes appreciated  over the lungs bilaterally. CV: Rate is regular. Rhythm is regular. No gallop or rubs. No heart murmur appreciated. Extremities: No clubbing, cyanosis, or edema. No calf inflammation or tenderness. Abdomen: Bowel sounds are normoactive. Abdomen is soft, nontender, and nondistended. No  abdominal masses. No palpable hepatosplenomegaly.   Lymph: No palpable or visible regional lymphadenopathy. Musculoskeletal: no acute joint inflammation. Skin: Dry and warm with no rash. Skin normal to inspection and palpation overall. Neuro: Alert and oriented. Affect: appropriate. Upper Extremities: 5/5 bilaterally. Lower Extremities:  5/5 bilaterally. Sensation intact to light touch. Reflexes: DTR's are symmetric and 2+ bilaterally. .  Cranial Nerves: Cranial nerves grossly intact. Genitalia/Rectal- defers      Assessment and Plan:   Diagnosis Orders   1. Health maintenance examination  Lipid Panel    TSH    Comprehensive Metabolic Panel    CBC with Auto Differential    Urinalysis   2. Anxiety  sertraline (ZOLOFT) 100 MG tablet   3. Prostate cancer screening  PSA Screening   4. ANA LUISA (obstructive sleep apnea)     5. Class 3 severe obesity due to excess calories without serious comorbidity with body mass index (BMI) of 40.0 to 44.9 in adult [facility])         Routine adult health maintenance  Health maintenance issues discussed at length as above, 3/11/22  Encouraged yearly physicals. Anxiety  Counseled. Tolerating zoloft very well and wants to continue. SE's/Instructions/R/B ADR reviewed. approp counseling reviewed. RF. Adamantly denies SI/HI. ref'd    Class 3 severe obesity due to excess calories without serious comorbidity with body mass index (BMI) of 40.0 to 44.9 in adult ([address]  Counseled. Declines formal intervention. Lifestyle modification emphasized, healthy diet and exercise. Counseled on wt watchers. Defers formal intervention    ANA LUISA (obstructive sleep apnea)  Counseled. Tolerating CPAP very well and asx with it. Uses it at least 6-8 hours per night 7 nights per week. No narcoleptic sxs         No flowsheet data found. Plan as above. Counseled extensively and differential diagnoses relevant to above were reviewed, including serious etiologies. Side effects and interactions of medications were reviewed. rf zoloft. Simply wants bw and fu 1yr, sooner prn.  Wt loss measures reviewed. As long as symptoms steadily improve/resolve, and medical conditions follow the expected course, FU as below, sooner PRN. Return in about 1 year (around 3/11/2023), or if symptoms worsen or fail to improve, for physical.         Signs and symptoms to watch for discussed, serious signs and symptoms reviewed. ER if any. Diana Beck MD    Patients are advised to check with insurance company to ensure coverage and to fully understand benefits and cost prior to any testing. This note was created with the assistance of voice recognition software. Document was reviewed however may contain grammatical errors.

## 2022-11-17 ENCOUNTER — OFFICE VISIT (OUTPATIENT)
Dept: FAMILY MEDICINE CLINIC | Age: 42
End: 2022-11-17
Payer: COMMERCIAL

## 2022-11-17 VITALS
WEIGHT: 315 LBS | SYSTOLIC BLOOD PRESSURE: 126 MMHG | DIASTOLIC BLOOD PRESSURE: 84 MMHG | BODY MASS INDEX: 45.24 KG/M2 | HEART RATE: 57 BPM | TEMPERATURE: 98.1 F | OXYGEN SATURATION: 99 %

## 2022-11-17 DIAGNOSIS — J32.9 SINOBRONCHITIS: Primary | ICD-10-CM

## 2022-11-17 DIAGNOSIS — J40 SINOBRONCHITIS: Primary | ICD-10-CM

## 2022-11-17 DIAGNOSIS — H69.81 ETD (EUSTACHIAN TUBE DYSFUNCTION), RIGHT: ICD-10-CM

## 2022-11-17 DIAGNOSIS — R05.9 COUGH, UNSPECIFIED TYPE: ICD-10-CM

## 2022-11-17 PROCEDURE — 99213 OFFICE O/P EST LOW 20 MIN: CPT

## 2022-11-17 RX ORDER — GUAIFENESIN AND DEXTROMETHORPHAN HYDROBROMIDE 600; 30 MG/1; MG/1
1 TABLET, EXTENDED RELEASE ORAL 2 TIMES DAILY
Qty: 28 TABLET | Refills: 0 | Status: SHIPPED | OUTPATIENT
Start: 2022-11-17 | End: 2022-12-01

## 2022-11-17 RX ORDER — PREDNISONE 10 MG/1
TABLET ORAL
Qty: 18 TABLET | Refills: 0 | Status: SHIPPED | OUTPATIENT
Start: 2022-11-17 | End: 2022-11-26

## 2022-11-17 RX ORDER — DOXYCYCLINE HYCLATE 100 MG
100 TABLET ORAL 2 TIMES DAILY
Qty: 20 TABLET | Refills: 0 | Status: SHIPPED | OUTPATIENT
Start: 2022-11-17 | End: 2022-11-27

## 2022-11-17 NOTE — PROGRESS NOTES
Chief Complaint:   Cough (Started around 3 weeks ago, seen at urgent care on 11/6, not feeling any better after finishing meds), Congestion, and Drainage      History of Present Illness   Source of history provided by:  patient. Jason Fox is a 40 y.o. old male who presents to walk-in with a cough for the past couple of weeks. States the cough is moist productive with yellow sputum. No subjective fever noted. States they have a sore throat, mild HA, ear discomfort, and therapy at home has not been successful in alleviating symptoms. Denies any N/V/D, abdominal pain, CP, progressive SOB, dizziness, or lethargy. Review of Systems    Unless otherwise stated in this report or unable to obtain because of the patient's clinical or mental status as evidenced by the medical record, this patients's positive and negative responses for Review of Systems, constitutional, psych, eyes, ENT, cardiovascular, respiratory, gastrointestinal, neurological, genitourinary, musculoskeletal, integument systems and systems related to the presenting problem are either stated in the preceding or were not pertinent or were negative for the symptoms and/or complaints related to the medical problem. Past Medical History:  has a past medical history of Depression. Past Surgical History:  has a past surgical history that includes lipoma resection. Social History:  reports that he has never smoked. He has never used smokeless tobacco. He reports current alcohol use. He reports that he does not use drugs. Family History: family history includes Cancer in his mother. Allergies: Patient has no known allergies. Physical Exam   Vital Signs:  /84 (Site: Right Upper Arm, Position: Sitting)   Pulse 57   Temp 98.1 °F (36.7 °C) (Temporal)   Wt (!) 333 lb 9.6 oz (151.3 kg)   SpO2 99%   BMI 45.24 kg/m²    Oxygen Saturation Interpretation: Normal.    Constitutional:  Alert, development consistent with age.   Ears: External Ears: Normal bilateral pinna. TM's & External Canals: TM's normal bilaterally without perforation. Canals without erythema or drainage. Nose:   There is no obvious septal defect. Mouth:  Moist bucca mucosa and normal tongue. Throat: Mild posterior pharyngeal erythema without exudates or lesions. Neck:  Supple. There is no obvious adenopathy or neck tenderness. Lungs:   Breath sounds: Normal chest expansion and breath sounds noted throughout. Negative wheezes, rales, or rhonchi noted. Heart:  Regular rate and rhythm, normal heart sounds, without pathological murmurs, ectopy, gallops, or rubs. Abdomen:  Soft, nontender, good bowel sounds. No firm or pulsatile mass. Skin:  Normal turgor. Warm, dry, without visible rash. Neurological:  Oriented. Motor functions intact. Test Results Section   (All laboratory and radiology results have been personally reviewed by myself)  Labs:  No results found for this visit on 11/17/22. Imaging: All Radiology results interpreted by Radiologist unless otherwise noted. No results found. Assessment / Plan   Impression(s):  Roland Moscoso was seen today for cough, congestion and drainage. Diagnoses and all orders for this visit:    Sinobronchitis  -     doxycycline hyclate (VIBRA-TABS) 100 MG tablet; Take 1 tablet by mouth 2 times daily for 10 days    ETD (Eustachian tube dysfunction), right  -     predniSONE (DELTASONE) 10 MG tablet; Take 1 tablet by mouth three times daily for 3 days, THEN 1 tablet 2 times daily for 3 days, THEN 1 tablet daily for 3 days. Cough, unspecified type  -     XR CHEST STANDARD (2 VW); Future  -     Dextromethorphan-guaiFENesin (MUCINEX DM)  MG TB12; Take 1 tablet by mouth 2 times daily for 14 days      Prescription sent for Doxycycline, Prednisone, and Mucinex DM, side effects discussed. Pt advised to f/u with PCP in 7-10 days for recheck. ER if changes or worse.   Advised to take all medications as directed. Patient verbalized understanding and agreeable to plan of care. All questions answered. Electronically signed by PROSPER Rodgers CNP   DD: 11/17/22    **This report was transcribed using voice recognition software. Every effort was made to ensure accuracy; however, inadvertent computerized transcription errors may be present.

## 2023-03-14 LAB
ALBUMIN SERPL-MCNC: 4.2 G/DL
ALP BLD-CCNC: 75 U/L
ALT SERPL-CCNC: 34 U/L
ANION GAP SERPL CALCULATED.3IONS-SCNC: ABNORMAL MMOL/L
AST SERPL-CCNC: 26 U/L
BASOPHILS ABSOLUTE: 21 /ΜL
BASOPHILS RELATIVE PERCENT: 0.3 %
BILIRUB SERPL-MCNC: 0.6 MG/DL (ref 0.1–1.4)
BILIRUBIN, URINE: NEGATIVE
BLOOD, URINE: NEGATIVE
BUN BLDV-MCNC: 15 MG/DL
CALCIUM SERPL-MCNC: 9.1 MG/DL
CHLORIDE BLD-SCNC: 104 MMOL/L
CHOLESTEROL, TOTAL: 126 MG/DL
CHOLESTEROL/HDL RATIO: 3.7
CLARITY: CLEAR
CO2: 27 MMOL/L
COLOR: YELLOW
CREAT SERPL-MCNC: 0.97 MG/DL
EGFR: 101
EOSINOPHILS ABSOLUTE: 159 /ΜL
EOSINOPHILS RELATIVE PERCENT: 2.3 %
GLUCOSE BLD-MCNC: 104 MG/DL
GLUCOSE URINE: NEGATIVE
HCT VFR BLD CALC: 47.1 % (ref 41–53)
HDLC SERPL-MCNC: 34 MG/DL (ref 35–70)
HEMOGLOBIN: 16 G/DL (ref 13.5–17.5)
KETONES, URINE: NEGATIVE
LDL CHOLESTEROL CALCULATED: 74 MG/DL (ref 0–160)
LEUKOCYTE ESTERASE, URINE: NEGATIVE
LYMPHOCYTES ABSOLUTE: 1856 /ΜL
LYMPHOCYTES RELATIVE PERCENT: 26.9 %
MCH RBC QN AUTO: 27.8 PG
MCHC RBC AUTO-ENTMCNC: 34 G/DL
MCV RBC AUTO: 81.8 FL
MONOCYTES ABSOLUTE: 725 /ΜL
MONOCYTES RELATIVE PERCENT: 1.5 %
NEUTROPHILS ABSOLUTE: 4140 /ΜL
NEUTROPHILS RELATIVE PERCENT: 60 %
NITRITE, URINE: NEGATIVE
NONHDLC SERPL-MCNC: 92 MG/DL
PDW BLD-RTO: 13.1 %
PH UA: 5.5 (ref 4.5–8)
PLATELET # BLD: 283 K/ΜL
PMV BLD AUTO: 8.7 FL
POTASSIUM SERPL-SCNC: 4.2 MMOL/L
PROSTATE SPECIFIC ANTIGEN: 0.89 NG/ML
PROTEIN UA: NEGATIVE
RBC # BLD: 5.76 10^6/ΜL
SODIUM BLD-SCNC: 137 MMOL/L
SPECIFIC GRAVITY, URINE: 1.02
TOTAL PROTEIN: 6.7
TRIGL SERPL-MCNC: 96 MG/DL
TSH SERPL DL<=0.05 MIU/L-ACNC: 4.11 UIU/ML
UROBILINOGEN, URINE: NORMAL
VLDLC SERPL CALC-MCNC: ABNORMAL MG/DL
WBC # BLD: 6.9 10^3/ML

## 2023-03-15 DIAGNOSIS — Z12.5 PROSTATE CANCER SCREENING: ICD-10-CM

## 2023-03-15 DIAGNOSIS — Z00.00 HEALTH MAINTENANCE EXAMINATION: ICD-10-CM

## 2023-03-17 ENCOUNTER — OFFICE VISIT (OUTPATIENT)
Dept: PRIMARY CARE CLINIC | Age: 43
End: 2023-03-17
Payer: COMMERCIAL

## 2023-03-17 VITALS
HEART RATE: 80 BPM | WEIGHT: 315 LBS | OXYGEN SATURATION: 97 % | TEMPERATURE: 98 F | SYSTOLIC BLOOD PRESSURE: 132 MMHG | HEIGHT: 72 IN | BODY MASS INDEX: 42.66 KG/M2 | DIASTOLIC BLOOD PRESSURE: 70 MMHG

## 2023-03-17 DIAGNOSIS — Z12.5 PROSTATE CANCER SCREENING: ICD-10-CM

## 2023-03-17 DIAGNOSIS — E66.01 CLASS 3 SEVERE OBESITY DUE TO EXCESS CALORIES WITHOUT SERIOUS COMORBIDITY WITH BODY MASS INDEX (BMI) OF 40.0 TO 44.9 IN ADULT (HCC): ICD-10-CM

## 2023-03-17 DIAGNOSIS — G47.33 OSA (OBSTRUCTIVE SLEEP APNEA): ICD-10-CM

## 2023-03-17 DIAGNOSIS — Z00.00 HEALTH MAINTENANCE EXAMINATION: Primary | ICD-10-CM

## 2023-03-17 DIAGNOSIS — F41.9 ANXIETY: ICD-10-CM

## 2023-03-17 PROCEDURE — 99396 PREV VISIT EST AGE 40-64: CPT | Performed by: FAMILY MEDICINE

## 2023-03-17 RX ORDER — SERTRALINE HYDROCHLORIDE 100 MG/1
100 TABLET, FILM COATED ORAL DAILY
Qty: 90 TABLET | Refills: 3 | Status: SHIPPED | OUTPATIENT
Start: 2023-03-17

## 2023-03-17 SDOH — ECONOMIC STABILITY: INCOME INSECURITY: HOW HARD IS IT FOR YOU TO PAY FOR THE VERY BASICS LIKE FOOD, HOUSING, MEDICAL CARE, AND HEATING?: NOT HARD AT ALL

## 2023-03-17 SDOH — ECONOMIC STABILITY: FOOD INSECURITY: WITHIN THE PAST 12 MONTHS, YOU WORRIED THAT YOUR FOOD WOULD RUN OUT BEFORE YOU GOT MONEY TO BUY MORE.: NEVER TRUE

## 2023-03-17 SDOH — ECONOMIC STABILITY: FOOD INSECURITY: WITHIN THE PAST 12 MONTHS, THE FOOD YOU BOUGHT JUST DIDN'T LAST AND YOU DIDN'T HAVE MONEY TO GET MORE.: NEVER TRUE

## 2023-03-17 SDOH — ECONOMIC STABILITY: HOUSING INSECURITY
IN THE LAST 12 MONTHS, WAS THERE A TIME WHEN YOU DID NOT HAVE A STEADY PLACE TO SLEEP OR SLEPT IN A SHELTER (INCLUDING NOW)?: NO

## 2023-03-17 ASSESSMENT — PATIENT HEALTH QUESTIONNAIRE - PHQ9
SUM OF ALL RESPONSES TO PHQ QUESTIONS 1-9: 0
1. LITTLE INTEREST OR PLEASURE IN DOING THINGS: 0
SUM OF ALL RESPONSES TO PHQ9 QUESTIONS 1 & 2: 0
2. FEELING DOWN, DEPRESSED OR HOPELESS: 0

## 2023-03-17 NOTE — PROGRESS NOTES
Radha Ramsey : 1980 Sex: male  Age: 40 y.o. Chief Complaint   Patient presents with    Annual Exam    Health Maintenance    Discuss Labs       HPI  HPI:    Overall feeling very well. Tolerating Zoloft would like to continue, asymptomatic with it. No ADRs. Health Maintenance: Well balanced/proper diet reviewed. Counseled on vitamins/supplents. Exercise recommendations reviewed. Reviewed  recommendations regarding Had covid , 3/22; covid vaccine J&J x 1;  Td (Tdap) (), pneumovax/prevar 13, flu shot (defers) , Hepatitis  vaccines and shingles vaccine, had chicken pox. Importance of Regular Eye and Dental exams reviewed. Risks/Benefits of ASA reviewed and discussed current recommendations. Caffeine risks/limits and Sun  protection reviewed. Notify if any new or changing moles/skin lesions etc. Dexa Scan indications/ risk  factors for osteoportic fractures and prevention reviewed. Counseled on testicular exams, prostate  exams. Colonoscopy recommendations reviewed. Pt denies change in bowel habbits or [address] of colon  polyps/CA. Heme Cards discussed. Indications for EKG (declines) and other  cardiac testing including referral, Stress test, 2D echo, etc. dasx  Reviewed indications for PFT's. asx. Also counseled on indications for imaging for further  imaging including Brain, carotid, chest, Abdominal, Aortic or otherwise. dasx      The ASCVD Risk score (Mirna PATEL, et al., 2019) failed to calculate for the following reasons:     The valid total cholesterol range is 130 to 320 mg/dL    BW reviewed overall stable glucose 104 HDL 34 LDL 74 triglyceride 96 TSH 4.11 and counseled, symptoms monitored and checked yearly, urinalysis negative, PSA 0.89    Not an  Most Recent Labs  CBC  Lab Results   Component Value Date/Time    WBC 6.9 2023 12:00 AM    WBC 9.1 2022 09:07 AM    WBC 7.7 2021 09:12 AM    RBC 5.76 2023 12:00 AM    RBC 5.58 2022 09:07 AM    RBC 5.68 02/05/2021 09:12 AM    HGB 16.0 03/14/2023 12:00 AM    HGB 16.1 03/04/2022 09:07 AM    HGB 16.4 02/05/2021 09:12 AM    HCT 47.1 03/14/2023 12:00 AM    HCT 47.4 03/04/2022 09:07 AM    HCT 48.1 02/05/2021 09:12 AM    MCV 81.8 03/14/2023 12:00 AM    MCV 84.9 03/04/2022 09:07 AM    MCV 84.7 02/05/2021 09:12 AM     03/14/2023 12:00 AM     03/04/2022 09:07 AM     02/05/2021 09:12 AM      CMP  Lab Results   Component Value Date/Time     03/14/2023 12:00 AM     03/04/2022 09:07 AM     02/05/2021 09:12 AM    K 4.2 03/14/2023 12:00 AM    K 4.8 03/04/2022 09:07 AM    K 4.0 02/05/2021 09:12 AM     03/14/2023 12:00 AM     03/04/2022 09:07 AM    CL 98 02/05/2021 09:12 AM    CO2 27 03/14/2023 12:00 AM    CO2 22 03/04/2022 09:07 AM    CO2 24 02/05/2021 09:12 AM    ANIONGAP 17 03/04/2022 09:07 AM    ANIONGAP 15 02/05/2021 09:12 AM    GLUCOSE 104 03/14/2023 12:00 AM    GLUCOSE 105 03/04/2022 09:07 AM    GLUCOSE 103 02/05/2021 09:12 AM    BUN 15 03/14/2023 12:00 AM    BUN 17 03/04/2022 09:07 AM    BUN 17 02/05/2021 09:12 AM    CREATININE 0.97 03/14/2023 12:00 AM    CREATININE 1.1 03/04/2022 09:07 AM    CREATININE 1.2 02/05/2021 09:12 AM    LABGLOM 101 03/14/2023 12:00 AM    LABGLOM >60 03/04/2022 09:07 AM    LABGLOM >60 02/05/2021 09:12 AM    GFRAA >60 03/04/2022 09:07 AM    GFRAA >60 02/05/2021 09:12 AM    CALCIUM 9.1 03/14/2023 12:00 AM    CALCIUM 9.6 03/04/2022 09:07 AM    CALCIUM 9.4 02/05/2021 09:12 AM    PROT 7.7 03/04/2022 09:07 AM    PROT 7.5 02/05/2021 09:12 AM    LABALBU 4.2 03/14/2023 12:00 AM    LABALBU 4.5 03/04/2022 09:07 AM    LABALBU 4.6 02/05/2021 09:12 AM    BILITOT 0.6 03/14/2023 12:00 AM    BILITOT 0.7 03/04/2022 09:07 AM    BILITOT 0.6 02/05/2021 09:12 AM    ALKPHOS 75 03/14/2023 12:00 AM    ALKPHOS 90 03/04/2022 09:07 AM    ALKPHOS 85 02/05/2021 09:12 AM    AST 26 03/14/2023 12:00 AM    AST 26 03/04/2022 09:07 AM    AST 28 02/05/2021 09:12 AM    ALT 34 03/14/2023 12:00 AM    ALT 29 03/04/2022 09:07 AM    ALT 30 02/05/2021 09:12 AM     A1C  No results found for: LABA1C  TSH  Lab Results   Component Value Date/Time    TSH 4.11 03/14/2023 12:00 AM    TSH 2.980 03/04/2022 09:07 AM    TSH 3.410 02/05/2021 09:12 AM     FREET4  No results found for: [id number]  LIPID  Lab Results   Component Value Date/Time    CHOL 126 03/14/2023 12:00 AM    CHOL 172 03/04/2022 09:07 AM    CHOL 135 02/05/2021 09:12 AM    HDL 34 03/14/2023 12:00 AM    HDL 39 03/04/2022 09:07 AM    HDL 36 02/05/2021 09:12 AM    LDLCALC 74 03/14/2023 12:00 AM    LDLCALC 110 03/04/2022 09:07 AM    LDLCALC 80 02/05/2021 09:12 AM    TRIG 96 03/14/2023 12:00 AM    TRIG 116 03/04/2022 09:07 AM    TRIG 96 02/05/2021 09:12 AM    CHOLHDLRATIO 3.7 03/14/2023 12:00 AM     VITAMIN D  No results found for: VITD25  MAGNESIUM  No results found for: MG   PHOS  No results found for: PHOS   BIJU   No results found for: BIJU  RHEUMATOID FACTOR  No results found for: RF  PSA  Lab Results   Component Value Date/Time    PSA 0.89 03/14/2023 12:00 AM    PSA 0.85 03/04/2022 09:07 AM      HEPATITIS C  No results found for: HCVABI  HIV  No results found for: [id number], HIV1QT  UA  Lab Results   Component Value Date/Time    COLORU Yellow 03/14/2023 12:00 AM    COLORU Yellow 03/04/2022 09:05 AM    COLORU Yellow 02/05/2021 09:13 AM    CLARITYU Clear 03/14/2023 12:00 AM    CLARITYU Clear 03/04/2022 09:05 AM    CLARITYU Clear 02/05/2021 09:13 AM    GLUCOSEU negative 03/14/2023 12:00 AM    GLUCOSEU Negative 03/04/2022 09:05 AM    GLUCOSEU Negative 02/05/2021 09:13 AM    BILIRUBINUR Negative 03/14/2023 12:00 AM    BILIRUBINUR Negative 03/04/2022 09:05 AM    BILIRUBINUR Negative 02/05/2021 09:13 AM    KETUA Negative 03/14/2023 12:00 AM    KETUA Negative 03/04/2022 09:05 AM    KETUA Negative 02/05/2021 09:13 AM    SPECGRAV >=1.030 03/04/2022 09:05 AM    SPECGRAV 1.025 02/05/2021 09:13 AM    BLOODU Negative 03/14/2023 12:00 AM    BLOODU Negative 03/04/2022 09:05 AM    BLOODU Negative 02/05/2021 09:13 AM    PHUR 5.5 03/14/2023 12:00 AM    PHUR 6.0 03/04/2022 09:05 AM    PHUR 6.0 02/05/2021 09:13 AM    PROTEINU Negative 03/14/2023 12:00 AM    PROTEINU Negative 03/04/2022 09:05 AM    PROTEINU Negative 02/05/2021 09:13 AM    UROBILINOGEN Normal 03/14/2023 12:00 AM    UROBILINOGEN 0.2 03/04/2022 09:05 AM    UROBILINOGEN 0.2 02/05/2021 09:13 AM    NITRU Negative 03/14/2023 12:00 AM    NITRU Negative 03/04/2022 09:05 AM    NITRU Negative 02/05/2021 09:13 AM    LEUKOCYTESUR Negative 03/14/2023 12:00 AM    LEUKOCYTESUR Negative 03/04/2022 09:05 AM    LEUKOCYTESUR Negative 02/05/2021 09:13 AM     Urine Micro/Albumin Ratio  No results found for: MALBCR      ROS:  Const: Denies chills, fever, malaise and sweats. Eyes: Denies discharge, pain, redness and visual disturbance. ENMT: Denies earaches, other ear symptoms. Denies nasal or sinus symptoms other than stated  above. Denies mouth and tongue lesions and sore throat. CV: Denies chest discomfort, pain; diaphoresis, dizziness, edema, lightheadedness, orthopnea,  palpitations, syncope and near syncopal episode or any exertional symptoms  Resp: Denies cough, hemoptysis, pleuritic pain, SOB, sputum production and wheezing. GI: Denies abdominal pain, change in bowel habits, hematochezia, melena, nausea and vomiting. : Denies urinary symptoms including dysuria , urgency, frequency or hematuria. Musculo: Denies musculoskeletal symptoms. Skin: Denies bruising and rash.   Neuro: Denies headache, numbness, stiff neck, tingling and focal weakness slurred speech or facial  droop  Hema/Lymph: Denies bleeding/bruising tendency and enlarged lymph nodes        Current Outpatient Medications:     sertraline (ZOLOFT) 100 MG tablet, Take 1 tablet by mouth daily, Disp: 90 tablet, Rfl: 3  No Known Allergies    Past Medical History:   Diagnosis Date    Depression      Past Surgical History:   Procedure Laterality Date    LIPOMA RESECTION Family History   Problem Relation Age of Onset    Cancer Mother         breast      Social History     Tobacco Use    Smoking status: Never    Smokeless tobacco: Never   Substance Use Topics    Alcohol use: Yes     Comment: occasionally     Drug use: Never      Social History     Social History Narrative    PMH:    Medical Problems:    Denies    Surgical Hx:    Denies    Reviewed, no changes. FH:    Father:    . (Hx)    Mother:    . (Hx)    mom - breast ca dx late 42's,  54    dad - healthy    Reviewed, no changes. SH:    . (Marital),     wife  mrdd    1 boy 5yrs as of     Personal Habits: Cigarette Use: Never Smoked Cigarettes. Alcohol: Occasionally consumes    alcohol. Reviewed, no changes. Vitals:    23 0853   BP: 132/70   Pulse: 80   Temp: 98 °F (36.7 °C)   SpO2: 97%   Weight: (!) 339 lb (153.8 kg)   Height: 6' (1.829 m)      Wt Readings from Last 3 Encounters:   23 (!) 339 lb (153.8 kg)   22 (!) 333 lb 9.6 oz (151.3 kg)   22 (!) 333 lb (151 kg)      Counseled on wt. Exam:  Const: Appears comfortable. No signs of acute distress present. Head/Face: Atraumatic on inspection. Eyes: EOMI in both eyes. No discharge from the eyes. PERRL. Sclerae clear. ENMT: Auditory canals normal. Tympanic membranes: intact and translucent. External nose WNL. Nasal mucosa is clear. Oropharynx: No erythema or exudate. Posterior pharynx is normal.  Neck: Supple. Palpation reveals no adenopathy. No masses appreciated. No JVD. Carotids: no  bruits. Resp: Respirations are unlabored. Clear to auscultation. No rales, rhonchi or wheezes appreciated  over the lungs bilaterally. CV: Rate is regular. Rhythm is regular. No gallop or rubs. No heart murmur appreciated. Extremities: No clubbing, cyanosis, or edema. No calf inflammation or tenderness. Abdomen: Bowel sounds are normoactive. Abdomen is soft, nontender, and nondistended. No  abdominal masses. No palpable hepatosplenomegaly. Lymph: No palpable or visible regional lymphadenopathy. Musculoskeletal: no acute joint inflammation. Skin: Dry and warm with no rash. Skin normal to inspection and palpation overall. Neuro: Alert and oriented. Affect: appropriate. Upper Extremities: 5/5 bilaterally. Lower Extremities:  5/5 bilaterally. Sensation intact to light touch. Reflexes: DTR's are symmetric and 2+ bilaterally. .  Cranial Nerves: Cranial nerves grossly intact. Genitalia/Rectal- defers      Assessment and Plan:   Diagnosis Orders   1. Health maintenance examination  Lipid Panel    TSH    Comprehensive Metabolic Panel    CBC with Auto Differential    Urinalysis      2. Prostate cancer screening  PSA Screening      3. ANA LUISA (obstructive sleep apnea)        4. Anxiety  sertraline (ZOLOFT) 100 MG tablet      5. Class 3 severe obesity due to excess calories without serious comorbidity with body mass index (BMI) of 40.0 to 44.9 in adult [facility])            Routine adult health maintenance  Health maintenance issues discussed at length as above,3/17/23  Encouraged yearly physicals. Anxiety  Counseled. Tolerating zoloft very well and wants to continue. SE's/Instructions/R/B ADR reviewed. approp counseling reviewed. RF. Adamantly denies SI/HI. ref'd    Class 3 severe obesity due to excess calories without serious comorbidity with body mass index (BMI) of 40.0 to 44.9 in adult ([address]  Counseled. Not interested in formal intervention including pharmacotherapy such as semaglutide. Lifestyle modification emphasized, healthy diet and exercise. Counseled on wt watchers. Defers formal intervention    ANA LUISA (obstructive sleep apnea)  Counseled. Tolerating CPAP very well and asx with it. Uses it at least 6-8 hours per night 7 nights per week. No narcoleptic sxs         No flowsheet data found. Plan as above.   Counseled extensively and differential diagnoses relevant to above were reviewed, including serious etiologies, risks and complications, especially of left uncontrolled. If relevant, instructions and  alternatives to meds/treatment reviewed, as well as interactions, and  SE's/ADRs reviewed, notify immediately if any, discontinuing new meds if any. Plan made after discussion and shared decision making. rf zoloft. Simply wants bw and fu 1yr, sooner prn. Wt loss measures reviewed. As long as symptoms steadily improve/resolve, and medical conditions follow the expected course, FU as below, sooner PRN. Return in about 1 year (around 3/17/2024), or if symptoms worsen or fail to improve, for physical.                 Educational materials and/or home exercises printed for patient's review and were included in patient instructions on his/her After Visit Summary and given to patient at the end of visit. After discussion, patient and/or guardian verbalizes understanding, agrees, feels comfortable with and wishes to proceed with above treatment plan. Call for any pending results, FU sooner if abnormal, as needed or if any current symptoms persist/worsen. Advised patient to call with any new medication issues, and read all Rx info from pharmacy to assure aware of all possible risks and side effects of medication before taking. Reviewed age and gender appropriate health screening exams and vaccinations. Advised patient regarding importance of keeping up with recommended health maintenance and to schedule as soon as possible if overdue, as this is important in assessing for undiagnosed pathology, especially cancer, as well as protecting against potentially harmful/life threatening disease. Patient and/or guardian verbalizes understanding and agrees with above counseling, assessment and plan. All questions answered. Signs and symptoms to watch for discussed, serious signs and symptoms reviewed. ER if any.                Ashley Townsend MD    Patients are advised to check with insurance company to ensure coverage and to fully understand benefits and cost prior to any testing. This note was created with the assistance of voice recognition software. Document was reviewed however may contain grammatical errors.

## 2023-09-15 ENCOUNTER — OFFICE VISIT (OUTPATIENT)
Dept: FAMILY MEDICINE CLINIC | Age: 43
End: 2023-09-15
Payer: COMMERCIAL

## 2023-09-15 VITALS
WEIGHT: 315 LBS | SYSTOLIC BLOOD PRESSURE: 128 MMHG | HEART RATE: 76 BPM | TEMPERATURE: 97.8 F | BODY MASS INDEX: 44.48 KG/M2 | RESPIRATION RATE: 17 BRPM | DIASTOLIC BLOOD PRESSURE: 76 MMHG | OXYGEN SATURATION: 97 %

## 2023-09-15 DIAGNOSIS — R09.81 NASAL CONGESTION: ICD-10-CM

## 2023-09-15 DIAGNOSIS — R09.82 POSTNASAL DRIP: ICD-10-CM

## 2023-09-15 DIAGNOSIS — J01.90 ACUTE NON-RECURRENT SINUSITIS, UNSPECIFIED LOCATION: Primary | ICD-10-CM

## 2023-09-15 DIAGNOSIS — R07.0 PAIN IN THROAT: ICD-10-CM

## 2023-09-15 DIAGNOSIS — R05.9 COUGH, UNSPECIFIED TYPE: ICD-10-CM

## 2023-09-15 PROCEDURE — 99203 OFFICE O/P NEW LOW 30 MIN: CPT | Performed by: PHYSICIAN ASSISTANT

## 2023-09-15 RX ORDER — PREDNISONE 10 MG/1
TABLET ORAL
Qty: 18 TABLET | Refills: 0 | Status: SHIPPED | OUTPATIENT
Start: 2023-09-15

## 2023-09-15 RX ORDER — AMOXICILLIN AND CLAVULANATE POTASSIUM 875; 125 MG/1; MG/1
1 TABLET, FILM COATED ORAL 2 TIMES DAILY
Qty: 20 TABLET | Refills: 0 | Status: SHIPPED | OUTPATIENT
Start: 2023-09-15 | End: 2023-09-25

## 2023-09-15 ASSESSMENT — PATIENT HEALTH QUESTIONNAIRE - PHQ9
SUM OF ALL RESPONSES TO PHQ QUESTIONS 1-9: 0
2. FEELING DOWN, DEPRESSED OR HOPELESS: 0
1. LITTLE INTEREST OR PLEASURE IN DOING THINGS: 0
SUM OF ALL RESPONSES TO PHQ QUESTIONS 1-9: 0
SUM OF ALL RESPONSES TO PHQ9 QUESTIONS 1 & 2: 0

## 2023-09-15 NOTE — PROGRESS NOTES
9/15/23  Joseluis Peoples : 1980 Sex: male  Age 44 y.o. Subjective:  Chief Complaint   Patient presents with    Cough     Cough: productive: yellow/ green thick   Nasal congestion   (-) N&V  (-) fevers  (-) diarrhea   Patient refused testing at this time    Pharyngitis     Sore throat   X2 days ago   Doesn't want tested for strep: \"doesn't feel like strep\"         HPI:   HPI  Joseluis Peoples , 44 y.o. male presents to express care for evaluation of sinus congestion, drainage, cough. The patient has had the symptoms ongoing for a couple of days now. The patient states that he has not really had any nausea, vomiting, fever, chills, diarrhea. No body aches, no loss of smell, taste. The patient has noted a cough with some yellow thick green sputum. The patient states that this pretty classic of his sinus symptoms this time of year. The patient is taking some over-the-counter antihistamines. The patient has not any chest pain, shortness of breath. ROS:   Unless otherwise stated in this report the patient's positive and negative responses for review of systems for constitutional, eyes, ENT, cardiovascular, respiratory, gastrointestinal, neurological, , musculoskeletal, and integument systems and related systems to the presenting problem are either stated in the history of present illness or were not pertinent or were negative for the symptoms and/or complaints related to the presenting medical problem. Positives and pertinent negatives as per HPI. All others reviewed and are negative.       PMH:     Past Medical History:   Diagnosis Date    Depression        Past Surgical History:   Procedure Laterality Date    LIPOMA RESECTION         Family History   Problem Relation Age of Onset    Cancer Mother         breast        Medications:     Current Outpatient Medications:     amoxicillin-clavulanate (AUGMENTIN) 875-125 MG per tablet, Take 1 tablet by mouth 2 times daily for 10 days, Disp: 20

## 2023-09-26 ENCOUNTER — OFFICE VISIT (OUTPATIENT)
Dept: FAMILY MEDICINE CLINIC | Age: 43
End: 2023-09-26
Payer: COMMERCIAL

## 2023-09-26 VITALS
OXYGEN SATURATION: 99 % | SYSTOLIC BLOOD PRESSURE: 130 MMHG | DIASTOLIC BLOOD PRESSURE: 82 MMHG | TEMPERATURE: 97.9 F | HEART RATE: 78 BPM | WEIGHT: 315 LBS | BODY MASS INDEX: 42.66 KG/M2 | HEIGHT: 72 IN

## 2023-09-26 DIAGNOSIS — J01.41 ACUTE RECURRENT PANSINUSITIS: Primary | ICD-10-CM

## 2023-09-26 PROCEDURE — 99213 OFFICE O/P EST LOW 20 MIN: CPT

## 2023-09-26 RX ORDER — AZITHROMYCIN 250 MG/1
250 TABLET, FILM COATED ORAL SEE ADMIN INSTRUCTIONS
Qty: 6 TABLET | Refills: 0 | Status: SHIPPED | OUTPATIENT
Start: 2023-09-26 | End: 2023-10-01

## 2023-09-26 RX ORDER — AZELASTINE 1 MG/ML
2 SPRAY, METERED NASAL 2 TIMES DAILY
Qty: 60 ML | Refills: 0 | Status: SHIPPED | OUTPATIENT
Start: 2023-09-26

## 2023-09-26 RX ORDER — BENZONATATE 200 MG/1
200 CAPSULE ORAL 3 TIMES DAILY PRN
Qty: 21 CAPSULE | Refills: 0 | Status: SHIPPED | OUTPATIENT
Start: 2023-09-26 | End: 2023-10-03

## 2023-09-26 NOTE — PROGRESS NOTES
2023     Jenaro Age Shelton 44 y.o. male    : 1980   Chief Complaint:   Sinusitis (Started 9/15/23. Was here last Monday and got an RX and it did not clear. ), Ear Fullness (Left ear fullness), Headache, Cough (Yellow phlegm), and Pharyngitis (Declines testing )      History of Present Illness   Source of history provided by:  patient. Antonio Betts is a 44 y.o. old male who presents to walk-in for evaluation of cough x several days. Associated symptoms include congestion. Since onset symptoms have been consistent. Patient has had no known Covid 19 exposure. Patient has not been diagnosed with COVID-19 in the last 90 days. Has taken prescribed Augmentin and Prednisone at home with no symptomatic relief. Denies any fever, chills, CP, dyspnea, LE edema, abdominal pain, nausea, vomiting, rash, dizziness, or lethargy. Denies any history of asthma, pneumonia, recurrent bronchitis or COPD. They have no history of tobacco abuse. ROS   Past Medical History:   Past Medical History:   Diagnosis Date    Depression      Past Surgical History:  has a past surgical history that includes lipoma resection. Social History:  reports that he has never smoked. He has never used smokeless tobacco. He reports current alcohol use. He reports that he does not use drugs. Family History: family history includes Cancer in his mother. Allergies: Patient has no known allergies. Unless otherwise stated in this report the patient's positive and negative responses for review of systems for constitutional, eyes, ENT, cardiovascular, respiratory, gastrointestinal, neurological, , musculoskeletal, and integument systems and related systems to the presenting problem are either stated in the history of present illness or were not pertinent or were negative for the symptoms and/or complaints related to the presenting medical problem. Positives and pertinent negatives as per HPI.   All others reviewed and are

## 2024-01-16 ENCOUNTER — TELEMEDICINE (OUTPATIENT)
Dept: PRIMARY CARE CLINIC | Age: 44
End: 2024-01-16
Payer: COMMERCIAL

## 2024-01-16 DIAGNOSIS — F41.9 ANXIETY: Primary | ICD-10-CM

## 2024-01-16 PROCEDURE — 99214 OFFICE O/P EST MOD 30 MIN: CPT | Performed by: FAMILY MEDICINE

## 2024-01-16 ASSESSMENT — PATIENT HEALTH QUESTIONNAIRE - PHQ9
1. LITTLE INTEREST OR PLEASURE IN DOING THINGS: 0
SUM OF ALL RESPONSES TO PHQ QUESTIONS 1-9: 0
SUM OF ALL RESPONSES TO PHQ QUESTIONS 1-9: 0
SUM OF ALL RESPONSES TO PHQ9 QUESTIONS 1 & 2: 0
SUM OF ALL RESPONSES TO PHQ QUESTIONS 1-9: 0
SUM OF ALL RESPONSES TO PHQ QUESTIONS 1-9: 0
2. FEELING DOWN, DEPRESSED OR HOPELESS: 0

## 2024-01-16 ASSESSMENT — ANXIETY QUESTIONNAIRES
6. BECOMING EASILY ANNOYED OR IRRITABLE: 3
4. TROUBLE RELAXING: 1
5. BEING SO RESTLESS THAT IT IS HARD TO SIT STILL: 0
7. FEELING AFRAID AS IF SOMETHING AWFUL MIGHT HAPPEN: 0
1. FEELING NERVOUS, ANXIOUS, OR ON EDGE: 3
IF YOU CHECKED OFF ANY PROBLEMS ON THIS QUESTIONNAIRE, HOW DIFFICULT HAVE THESE PROBLEMS MADE IT FOR YOU TO DO YOUR WORK, TAKE CARE OF THINGS AT HOME, OR GET ALONG WITH OTHER PEOPLE: VERY DIFFICULT
GAD7 TOTAL SCORE: 7
2. NOT BEING ABLE TO STOP OR CONTROL WORRYING: 0
3. WORRYING TOO MUCH ABOUT DIFFERENT THINGS: 0

## 2024-01-16 NOTE — PROGRESS NOTES
Patient and/or guardian verbalizes understanding and agrees with above counseling, assessment and plan.     All questions answered.          Signs and symptoms to watch for discussed, serious signs and symptoms reviewed.  ER if any.              Patients are advised to check with insurance company to ensure coverage and to fully understand benefits and cost prior to any testing to try to avoid unexpected charges. This note was created with the assistance of voice recognition software. Inadvertent errors may be present.           --Yomi Mi MD on 1/16/2024 at 2:14 PM    An electronic signature was used to authenticate this note.

## 2024-03-04 LAB
ALBUMIN SERPL-MCNC: 4.3 G/DL
ALP BLD-CCNC: 72 U/L
ALT SERPL-CCNC: 20 U/L
ANION GAP SERPL CALCULATED.3IONS-SCNC: ABNORMAL MMOL/L
AST SERPL-CCNC: 21 U/L
BASOPHILS ABSOLUTE: 21 /ΜL
BASOPHILS RELATIVE PERCENT: 0.3 %
BILIRUB SERPL-MCNC: 0.6 MG/DL (ref 0.1–1.4)
BILIRUBIN, URINE: NEGATIVE
BLOOD, URINE: NEGATIVE
BUN BLDV-MCNC: 18 MG/DL
CALCIUM SERPL-MCNC: 8.8 MG/DL
CHLORIDE BLD-SCNC: 104 MMOL/L
CHOLESTEROL, TOTAL: 130 MG/DL
CHOLESTEROL/HDL RATIO: 3.6
CLARITY: CLEAR
CO2: 25 MMOL/L
COLOR: YELLOW
CREAT SERPL-MCNC: 0.91 MG/DL
EGFR: 108
EOSINOPHILS ABSOLUTE: 213 /ΜL
EOSINOPHILS RELATIVE PERCENT: 3 %
GLUCOSE BLD-MCNC: 109 MG/DL
GLUCOSE URINE: NEGATIVE
HCT VFR BLD CALC: 45.3 % (ref 41–53)
HDLC SERPL-MCNC: 36 MG/DL (ref 35–70)
HEMOGLOBIN: 15.4 G/DL (ref 13.5–17.5)
KETONES, URINE: NEGATIVE
LDL CHOLESTEROL CALCULATED: 77 MG/DL (ref 0–160)
LEUKOCYTE ESTERASE, URINE: NEGATIVE
LYMPHOCYTES ABSOLUTE: 1825 /ΜL
LYMPHOCYTES RELATIVE PERCENT: 25.7 %
MCH RBC QN AUTO: 28.1 PG
MCHC RBC AUTO-ENTMCNC: 34 G/DL
MCV RBC AUTO: 82.7 FL
MONOCYTES ABSOLUTE: 646 /ΜL
MONOCYTES RELATIVE PERCENT: 9.1 %
NEUTROPHILS ABSOLUTE: 4395 /ΜL
NEUTROPHILS RELATIVE PERCENT: 61.9 %
NITRITE, URINE: NEGATIVE
NONHDLC SERPL-MCNC: 94 MG/DL
PDW BLD-RTO: 13.1 %
PH UA: 6 (ref 4.5–8)
PLATELET # BLD: 284 K/ΜL
PMV BLD AUTO: 8.8 FL
POTASSIUM SERPL-SCNC: 4.2 MMOL/L
PROSTATE SPECIFIC ANTIGEN: 0.85 NG/ML
PROTEIN UA: NEGATIVE
RBC # BLD: 5.48 10^6/ΜL
SODIUM BLD-SCNC: 138 MMOL/L
SPECIFIC GRAVITY, URINE: 1.02
TOTAL PROTEIN: 6.6
TRIGL SERPL-MCNC: 89 MG/DL
TSH SERPL DL<=0.05 MIU/L-ACNC: 3.42 UIU/ML
UROBILINOGEN, URINE: NORMAL
VLDLC SERPL CALC-MCNC: ABNORMAL MG/DL
WBC # BLD: 7.1 10^3/ML

## 2024-03-05 DIAGNOSIS — Z00.00 HEALTH MAINTENANCE EXAMINATION: ICD-10-CM

## 2024-03-05 DIAGNOSIS — Z12.5 PROSTATE CANCER SCREENING: ICD-10-CM

## 2024-03-08 ENCOUNTER — OFFICE VISIT (OUTPATIENT)
Dept: PRIMARY CARE CLINIC | Age: 44
End: 2024-03-08
Payer: COMMERCIAL

## 2024-03-08 VITALS
WEIGHT: 315 LBS | BODY MASS INDEX: 44.76 KG/M2 | DIASTOLIC BLOOD PRESSURE: 72 MMHG | TEMPERATURE: 97.5 F | HEART RATE: 71 BPM | OXYGEN SATURATION: 98 % | SYSTOLIC BLOOD PRESSURE: 128 MMHG

## 2024-03-08 DIAGNOSIS — R73.9 HYPERGLYCEMIA: ICD-10-CM

## 2024-03-08 DIAGNOSIS — Z23 ENCOUNTER FOR IMMUNIZATION: ICD-10-CM

## 2024-03-08 DIAGNOSIS — Z00.00 HEALTH MAINTENANCE EXAMINATION: Primary | ICD-10-CM

## 2024-03-08 DIAGNOSIS — Z12.5 PROSTATE CANCER SCREENING: ICD-10-CM

## 2024-03-08 DIAGNOSIS — F41.9 ANXIETY: ICD-10-CM

## 2024-03-08 LAB — HBA1C MFR BLD: 5 %

## 2024-03-08 PROCEDURE — 90715 TDAP VACCINE 7 YRS/> IM: CPT | Performed by: FAMILY MEDICINE

## 2024-03-08 PROCEDURE — 99396 PREV VISIT EST AGE 40-64: CPT | Performed by: FAMILY MEDICINE

## 2024-03-08 PROCEDURE — 83036 HEMOGLOBIN GLYCOSYLATED A1C: CPT | Performed by: FAMILY MEDICINE

## 2024-03-08 PROCEDURE — 90471 IMMUNIZATION ADMIN: CPT | Performed by: FAMILY MEDICINE

## 2024-03-08 RX ORDER — SERTRALINE HYDROCHLORIDE 100 MG/1
100 TABLET, FILM COATED ORAL DAILY
Qty: 90 TABLET | Refills: 3 | Status: SHIPPED | OUTPATIENT
Start: 2024-03-08

## 2024-03-08 NOTE — PROGRESS NOTES
Kvng ANDREWS Dvorovy : 1980 Sex: male  Age: 43 y.o.    Chief Complaint   Patient presents with    Annual Exam     Patient declines ekg    Health Maintenance    Discuss Labs       HPI  HPI:    Overall feeling very well.    Tolerating Zoloft would like to continue, asymptomatic with it.  No ADRs.  Ways of weaning reviewed.  Blood work reviewed.  Wants to stay conservative route.    Health Maintenance: Well balanced/proper diet reviewed. Counseled on vitamins/supplents. Exercise recommendations reviewed. Reviewed  recommendations regarding Had  J&J x 1;  has had covid multiple times. Counseled on boosters. Td (Tdap) (2014, agrees), pneumovax/prevar 13, flu shot (defers) , Hepatitis  vaccines and shingles vaccine, had chicken pox. Importance of Regular Eye and Dental exams reviewed.  Risks/Benefits of ASA reviewed and discussed current recommendations. Caffeine risks/limits and Sun  protection reviewed. Notify if any new or changing moles/skin lesions etc. Dexa Scan indications/ risk  factors for osteoportic fractures and prevention reviewed. Counseled on testicular exams, prostate  exams. Colonoscopy recommendations reviewed. Pt denies change in bowel habbits or FMH of colon  polyps/CA. Heme Cards discussed.     Indications for EKG (declines) and other  cardiac testing including referral, Stress test, 2D echo, etc. dasx  Reviewed indications for PFT's. asx. Also counseled on indications for imaging for further  imaging including Brain, carotid, chest, Abdominal, Aortic or otherwise. dasx      The 10-year ASCVD risk score (Mirna PATEL, et al., 2019) is: 1%    Values used to calculate the score:      Age: 43 years      Sex: Male      Is Non- : No      Diabetic: No      Tobacco smoker: No      Systolic Blood Pressure: 128 mmHg      Is BP treated: No      HDL Cholesterol: 36 mg/dL      Total Cholesterol: 130 mg/dL    BW reviewed blood work reviewed glucose 109.  A1c only 5.0.  Emphasized

## 2025-03-05 LAB
ALBUMIN: 4.5 G/DL
ALP BLD-CCNC: 76 U/L
ALT SERPL-CCNC: 17 U/L
ANION GAP SERPL CALCULATED.3IONS-SCNC: NORMAL MMOL/L
AST SERPL-CCNC: 16 U/L
BASOPHILS ABSOLUTE: 24 /ΜL
BASOPHILS RELATIVE PERCENT: 0.3 %
BILIRUB SERPL-MCNC: 0.6 MG/DL (ref 0.1–1.4)
BILIRUBIN, URINE: NEGATIVE
BLOOD, URINE: NEGATIVE
BUN BLDV-MCNC: 18 MG/DL
CALCIUM SERPL-MCNC: 9.2 MG/DL
CHLORIDE BLD-SCNC: 103 MMOL/L
CHOLESTEROL, TOTAL: 120 MG/DL
CHOLESTEROL/HDL RATIO: 3.5
CLARITY, UA: CLEAR
CO2: 28 MMOL/L
COLOR, UA: YELLOW
CREAT SERPL-MCNC: 0.93 MG/DL
EOSINOPHILS ABSOLUTE: 192 /ΜL
EOSINOPHILS RELATIVE PERCENT: 2.4 %
ESTIMATED AVERAGE GLUCOSE: NORMAL
GFR, ESTIMATED: 104
GLUCOSE BLD-MCNC: 93 MG/DL
GLUCOSE URINE: NEGATIVE
HBA1C MFR BLD: 4.6 %
HCT VFR BLD CALC: 47 % (ref 41–53)
HDLC SERPL-MCNC: 34 MG/DL (ref 35–70)
HEMOGLOBIN: 15.6 G/DL (ref 13.5–17.5)
KETONES, URINE: NEGATIVE
LDL CHOLESTEROL: 70
LEUKOCYTE ESTERASE, URINE: NEGATIVE
LYMPHOCYTES ABSOLUTE: 1736 /ΜL
LYMPHOCYTES RELATIVE PERCENT: 21.7 %
MCH RBC QN AUTO: 28.9 PG
MCHC RBC AUTO-ENTMCNC: 33.2 G/DL
MCV RBC AUTO: 87.2 FL
MONOCYTES ABSOLUTE: 672 /ΜL
MONOCYTES RELATIVE PERCENT: 8.4 %
NEUTROPHILS ABSOLUTE: 5376 /ΜL
NEUTROPHILS RELATIVE PERCENT: 67.2 %
NITRITE, URINE: NEGATIVE
NONHDLC SERPL-MCNC: 86 MG/DL
PDW BLD-RTO: 13.7 %
PH UA: 5.5 (ref 4.5–8)
PLATELET # BLD: 275 K/ΜL
PMV BLD AUTO: 8.7 FL
POTASSIUM SERPL-SCNC: 4.3 MMOL/L
PROSTATE SPECIFIC ANTIGEN: 0.97 NG/ML
PROTEIN UA: NEGATIVE
RBC # BLD: 5.39 10^6/ΜL
SODIUM BLD-SCNC: 137 MMOL/L
SPECIFIC GRAVITY UA: 1.02 (ref 1–1.03)
TOTAL PROTEIN: 7 G/DL (ref 6.4–8.2)
TRIGL SERPL-MCNC: 80 MG/DL
TSH SERPL DL<=0.05 MIU/L-ACNC: 3.33 UIU/ML
UROBILINOGEN, URINE: NORMAL
VLDLC SERPL CALC-MCNC: ABNORMAL MG/DL
WBC # BLD: 8 10^3/ML

## 2025-03-09 DIAGNOSIS — F41.9 ANXIETY: ICD-10-CM

## 2025-03-10 ENCOUNTER — PATIENT MESSAGE (OUTPATIENT)
Dept: PRIMARY CARE CLINIC | Age: 45
End: 2025-03-10

## 2025-03-10 ENCOUNTER — RESULTS FOLLOW-UP (OUTPATIENT)
Dept: PRIMARY CARE CLINIC | Age: 45
End: 2025-03-10

## 2025-03-10 DIAGNOSIS — R73.9 HYPERGLYCEMIA: ICD-10-CM

## 2025-03-10 DIAGNOSIS — Z12.5 PROSTATE CANCER SCREENING: ICD-10-CM

## 2025-03-10 DIAGNOSIS — Z00.00 HEALTH MAINTENANCE EXAMINATION: ICD-10-CM

## 2025-03-10 RX ORDER — SERTRALINE HYDROCHLORIDE 100 MG/1
100 TABLET, FILM COATED ORAL DAILY
Qty: 90 TABLET | Refills: 3 | Status: SHIPPED | OUTPATIENT
Start: 2025-03-10

## 2025-03-11 ASSESSMENT — PATIENT HEALTH QUESTIONNAIRE - PHQ9
SUM OF ALL RESPONSES TO PHQ9 QUESTIONS 1 & 2: 1
2. FEELING DOWN, DEPRESSED OR HOPELESS: SEVERAL DAYS
1. LITTLE INTEREST OR PLEASURE IN DOING THINGS: NOT AT ALL
SUM OF ALL RESPONSES TO PHQ QUESTIONS 1-9: 1
SUM OF ALL RESPONSES TO PHQ QUESTIONS 1-9: 1
2. FEELING DOWN, DEPRESSED OR HOPELESS: SEVERAL DAYS
SUM OF ALL RESPONSES TO PHQ QUESTIONS 1-9: 1
1. LITTLE INTEREST OR PLEASURE IN DOING THINGS: NOT AT ALL
SUM OF ALL RESPONSES TO PHQ QUESTIONS 1-9: 1

## 2025-03-14 ENCOUNTER — OFFICE VISIT (OUTPATIENT)
Dept: PRIMARY CARE CLINIC | Age: 45
End: 2025-03-14
Payer: COMMERCIAL

## 2025-03-14 VITALS
OXYGEN SATURATION: 98 % | SYSTOLIC BLOOD PRESSURE: 130 MMHG | HEIGHT: 72 IN | TEMPERATURE: 96.8 F | DIASTOLIC BLOOD PRESSURE: 68 MMHG | WEIGHT: 304 LBS | HEART RATE: 77 BPM | BODY MASS INDEX: 41.17 KG/M2

## 2025-03-14 DIAGNOSIS — E78.5 DYSLIPIDEMIA: ICD-10-CM

## 2025-03-14 DIAGNOSIS — G47.33 OSA (OBSTRUCTIVE SLEEP APNEA): ICD-10-CM

## 2025-03-14 DIAGNOSIS — E66.813 CLASS 3 SEVERE OBESITY DUE TO EXCESS CALORIES WITHOUT SERIOUS COMORBIDITY WITH BODY MASS INDEX (BMI) OF 40.0 TO 44.9 IN ADULT: ICD-10-CM

## 2025-03-14 DIAGNOSIS — E66.01 CLASS 3 SEVERE OBESITY DUE TO EXCESS CALORIES WITHOUT SERIOUS COMORBIDITY WITH BODY MASS INDEX (BMI) OF 40.0 TO 44.9 IN ADULT: ICD-10-CM

## 2025-03-14 DIAGNOSIS — Z00.00 HEALTH MAINTENANCE EXAMINATION: Primary | ICD-10-CM

## 2025-03-14 DIAGNOSIS — R73.9 HYPERGLYCEMIA: ICD-10-CM

## 2025-03-14 DIAGNOSIS — Z12.5 PROSTATE CANCER SCREENING: ICD-10-CM

## 2025-03-14 DIAGNOSIS — F41.9 ANXIETY: ICD-10-CM

## 2025-03-14 PROCEDURE — 99396 PREV VISIT EST AGE 40-64: CPT | Performed by: FAMILY MEDICINE

## 2025-03-14 SDOH — ECONOMIC STABILITY: FOOD INSECURITY: WITHIN THE PAST 12 MONTHS, THE FOOD YOU BOUGHT JUST DIDN'T LAST AND YOU DIDN'T HAVE MONEY TO GET MORE.: NEVER TRUE

## 2025-03-14 SDOH — ECONOMIC STABILITY: FOOD INSECURITY: WITHIN THE PAST 12 MONTHS, YOU WORRIED THAT YOUR FOOD WOULD RUN OUT BEFORE YOU GOT MONEY TO BUY MORE.: NEVER TRUE

## 2025-03-14 NOTE — PROGRESS NOTES
Kvng ANDREWS Dvorovy : 1980 Sex: male  Age: 44 y.o.    Chief Complaint   Patient presents with    Annual Exam    Health Maintenance    Discuss Labs       HPI  HPI:    Overall feeling very well.    Tolerating Zoloft would like to continue, asymptomatic with it.  No ADRs.  Recent refills.  Ways of weaning reviewed.  Blood work reviewed.  Wants to stay conservative route.    Health Maintenance: Well balanced/proper diet reviewed. Counseled on vitamins/supplents. Exercise recommendations reviewed. Reviewed  recommendations regarding Had  J&J x 1;  has had covid multiple times. Counseled on boosters. Td (Tdap) (, 3/24), prevnar 20 (counseled) flu shot (defers) , Hepatitis  vaccines (counseled) and shingles vaccine, had chicken pox. Importance of Regular Eye and Dental exams reviewed.  Risks/Benefits of ASA reviewed and discussed current recommendations. Caffeine risks/limits and Sun  protection reviewed. Notify if any new or changing moles/skin lesions etc. Dexa Scan indications/ risk  factors for osteoportic fractures and prevention reviewed. Counseled on testicular exams, prostate  exams. Colonoscopy recommendations reviewed. Pt denies change in bowel habbits or FMH of colon  polyps/CA. Heme Cards discussed.     Indications for EKG (declines) and other  cardiac testing including referral, Stress test, 2D echo, etc. dasx  Reviewed indications for PFT's. asx. Also counseled on indications for imaging for further  imaging including Brain, carotid, chest, Abdominal, Aortic or otherwise. dasx      The ASCVD Risk score (Mirna PATEL, et al., 2019) failed to calculate for the following reasons:    The valid total cholesterol range is 130 to 320 mg/dL    BW reviewed HDL 34 triglyceride 80 LDL 70 CMP normal CBC with differential normal TSH normal PSA 0.97 hemoglobin A1c 4.6 urinalysis negative    Not an  Most Recent Labs  CBC  Lab Results   Component Value Date/Time    WBC 8.0 2025 12:00 AM    WBC 7.1 2024